# Patient Record
Sex: FEMALE | Race: BLACK OR AFRICAN AMERICAN | Employment: FULL TIME | URBAN - METROPOLITAN AREA
[De-identification: names, ages, dates, MRNs, and addresses within clinical notes are randomized per-mention and may not be internally consistent; named-entity substitution may affect disease eponyms.]

---

## 2023-09-04 ENCOUNTER — HOSPITAL ENCOUNTER (EMERGENCY)
Facility: HOSPITAL | Age: 23
Discharge: HOME | End: 2023-09-04
Attending: STUDENT IN AN ORGANIZED HEALTH CARE EDUCATION/TRAINING PROGRAM
Payer: COMMERCIAL

## 2023-09-04 ENCOUNTER — APPOINTMENT (OUTPATIENT)
Facility: HOSPITAL | Age: 23
End: 2023-09-04
Payer: COMMERCIAL

## 2023-09-04 VITALS
DIASTOLIC BLOOD PRESSURE: 90 MMHG | WEIGHT: 220 LBS | HEART RATE: 80 BPM | RESPIRATION RATE: 18 BRPM | BODY MASS INDEX: 37.56 KG/M2 | HEIGHT: 64 IN | OXYGEN SATURATION: 100 % | TEMPERATURE: 99.4 F | SYSTOLIC BLOOD PRESSURE: 155 MMHG

## 2023-09-04 DIAGNOSIS — O20.0 THREATENED MISCARRIAGE IN EARLY PREGNANCY: ICD-10-CM

## 2023-09-04 DIAGNOSIS — O20.9 VAGINAL BLEEDING BEFORE 22 WEEKS GESTATION: Primary | ICD-10-CM

## 2023-09-04 LAB — HCG UR QL: NEGATIVE

## 2023-09-04 PROCEDURE — 86901 BLOOD TYPING SEROLOGIC RH(D): CPT

## 2023-09-04 PROCEDURE — 81025 URINE PREGNANCY TEST: CPT

## 2023-09-04 PROCEDURE — 36415 COLL VENOUS BLD VENIPUNCTURE: CPT

## 2023-09-04 PROCEDURE — 86900 BLOOD TYPING SEROLOGIC ABO: CPT

## 2023-09-04 PROCEDURE — 76801 OB US < 14 WKS SINGLE FETUS: CPT

## 2023-09-04 PROCEDURE — 99284 EMERGENCY DEPT VISIT MOD MDM: CPT

## 2023-09-04 ASSESSMENT — PAIN DESCRIPTION - LOCATION: LOCATION: ABDOMEN

## 2023-09-04 ASSESSMENT — LIFESTYLE VARIABLES
HOW MANY STANDARD DRINKS CONTAINING ALCOHOL DO YOU HAVE ON A TYPICAL DAY: PATIENT DOES NOT DRINK
HOW OFTEN DO YOU HAVE A DRINK CONTAINING ALCOHOL: NEVER

## 2023-09-04 ASSESSMENT — PAIN - FUNCTIONAL ASSESSMENT
PAIN_FUNCTIONAL_ASSESSMENT: NONE - DENIES PAIN
PAIN_FUNCTIONAL_ASSESSMENT: 0-10

## 2023-09-04 ASSESSMENT — PAIN SCALES - GENERAL: PAINLEVEL_OUTOF10: 7

## 2023-09-04 NOTE — ED PROVIDER NOTES
IMPRESSION   No diagnosis found. DISPOSITION/PLAN   DISPOSITION      Discharge Note: The patient is stable for discharge home. The signs, symptoms, diagnosis, and discharge instructions have been discussed, understanding conveyed, and agreed upon. The patient is to follow up as recommended or return to ER should their symptoms worsen. PATIENT REFERRED TO:  No follow-up provider specified. DISCHARGE MEDICATIONS:     Medication List      You have not been prescribed any medications. DISCONTINUED MEDICATIONS:  There are no discharge medications for this patient. I am the Primary Clinician of Record. Cassandra Keene MD (electronically signed)    (Please note that parts of this dictation were completed with voice recognition software. Quite often unanticipated grammatical, syntax, homophones, and other interpretive errors are inadvertently transcribed by the computer software. Please disregards these errors.  Please excuse any errors that have escaped final proofreading.)     Cassandra Keene MD  09/05/23 [id number]

## 2023-09-04 NOTE — DISCHARGE INSTRUCTIONS
Your Rh level is pending and we strongly advise you to follow-up on the result and share this with your obstetrician. You can access your results at the following link online:    [email or web address]     We strongly advise you to contact your obstetrician tomorrow for reassessment. Please return to the emergency department if you have continuation of significant bleeding, abdominal or pelvic pain, fevers, or any other concerning symptoms. Thank you! Thank you for allowing me to care for you in the emergency department. It is my goal to provide you with excellent care. If you have not received excellent quality care, please ask to speak to the nurse manager. Please fill out the survey that will come to you by mail or email since we listen to your feedback! Below you will find a list of your tests from today's visit. Should you have any questions, please do not hesitate to call the emergency department. Labs  Recent Results (from the past 12 hour(s))   POC Pregnancy Urine Qual    Collection Time: 09/04/23  2:08 PM   Result Value Ref Range    Preg Test, Ur Negative Negative         Radiologic Studies  US TRANSABDOMINAL TRANSVAGINAL LESS THAN 14 WEEKS   Final Result   1. No intrauterine pregnancy. Open cervix. 2. Hepatic steatosis.        ------------------------------------------------------------------------------------------------------------  The exam and treatment you received in the Emergency Department were for an urgent problem and are not intended as complete care. It is important that you follow-up with a doctor, nurse practitioner, or physician assistant to:  (1) confirm your diagnosis,  (2) re-evaluation of changes in your illness and treatment, and  (3) for ongoing care. Please take your discharge instructions with you when you go to your follow-up appointment. If you have any problem arranging a follow-up appointment, contact the Emergency Department.

## 2023-09-04 NOTE — ED TRIAGE NOTES
Patient states she had a positive home pregnancy test. Complains of vaginal bleeding x 2 days.  First OB appt scheduled for 9/22

## 2023-09-05 LAB
ABO + RH BLD: NORMAL
BLOOD BANK CMNT PATIENT-IMP: NORMAL

## 2024-09-12 ENCOUNTER — APPOINTMENT (OUTPATIENT)
Facility: HOSPITAL | Age: 24
End: 2024-09-12

## 2024-09-12 ENCOUNTER — HOSPITAL ENCOUNTER (EMERGENCY)
Facility: HOSPITAL | Age: 24
Discharge: HOME | End: 2024-09-12
Attending: STUDENT IN AN ORGANIZED HEALTH CARE EDUCATION/TRAINING PROGRAM

## 2024-09-12 VITALS
RESPIRATION RATE: 18 BRPM | TEMPERATURE: 97.5 F | HEART RATE: 91 BPM | OXYGEN SATURATION: 100 % | DIASTOLIC BLOOD PRESSURE: 91 MMHG | BODY MASS INDEX: 37.61 KG/M2 | SYSTOLIC BLOOD PRESSURE: 168 MMHG | WEIGHT: 225.75 LBS | HEIGHT: 65 IN

## 2024-09-12 DIAGNOSIS — R10.9 ABDOMINAL PAIN, UNSPECIFIED ABDOMINAL LOCATION: Primary | ICD-10-CM

## 2024-09-12 LAB
ALBUMIN SERPL-MCNC: 4 G/DL (ref 3.5–5)
ALBUMIN/GLOB SERPL: 1 (ref 1.1–2.2)
ALP SERPL-CCNC: 72 U/L (ref 45–117)
ALT SERPL-CCNC: 41 U/L (ref 12–78)
ANION GAP SERPL CALC-SCNC: 6 MMOL/L (ref 2–12)
APPEARANCE UR: CLEAR
AST SERPL-CCNC: 24 U/L (ref 15–37)
BACTERIA URNS QL MICRO: NEGATIVE /HPF
BASOPHILS # BLD: 0.1 K/UL (ref 0–0.1)
BASOPHILS NFR BLD: 1 % (ref 0–1)
BILIRUB SERPL-MCNC: 0.6 MG/DL (ref 0.2–1)
BILIRUB UR QL: NEGATIVE
BUN SERPL-MCNC: 10 MG/DL (ref 6–20)
BUN/CREAT SERPL: 13 (ref 12–20)
CALCIUM SERPL-MCNC: 9.3 MG/DL (ref 8.5–10.1)
CHLORIDE SERPL-SCNC: 105 MMOL/L (ref 97–108)
CO2 SERPL-SCNC: 26 MMOL/L (ref 21–32)
COLOR UR: ABNORMAL
CREAT SERPL-MCNC: 0.76 MG/DL (ref 0.55–1.02)
DIFFERENTIAL METHOD BLD: NORMAL
EOSINOPHIL # BLD: 0.1 K/UL (ref 0–0.4)
EOSINOPHIL NFR BLD: 1 % (ref 0–7)
EPITH CASTS URNS QL MICRO: ABNORMAL /LPF
ERYTHROCYTE [DISTWIDTH] IN BLOOD BY AUTOMATED COUNT: 12.6 % (ref 11.5–14.5)
GLOBULIN SER CALC-MCNC: 4 G/DL (ref 2–4)
GLUCOSE SERPL-MCNC: 101 MG/DL (ref 65–100)
GLUCOSE UR STRIP.AUTO-MCNC: NEGATIVE MG/DL
HCG SERPL-ACNC: 96 MIU/ML (ref 0–6)
HCT VFR BLD AUTO: 40.3 % (ref 35–47)
HGB BLD-MCNC: 13.8 G/DL (ref 11.5–16)
HGB UR QL STRIP: NEGATIVE
HYALINE CASTS URNS QL MICRO: ABNORMAL /LPF (ref 0–2)
IMM GRANULOCYTES # BLD AUTO: 0 K/UL (ref 0–0.04)
IMM GRANULOCYTES NFR BLD AUTO: 0 % (ref 0–0.5)
KETONES UR QL STRIP.AUTO: NEGATIVE MG/DL
LEUKOCYTE ESTERASE UR QL STRIP.AUTO: NEGATIVE
LIPASE SERPL-CCNC: 26 U/L (ref 13–75)
LYMPHOCYTES # BLD: 2.7 K/UL (ref 0.8–3.5)
LYMPHOCYTES NFR BLD: 29 % (ref 12–49)
MAGNESIUM SERPL-MCNC: 2.3 MG/DL (ref 1.6–2.4)
MCH RBC QN AUTO: 29 PG (ref 26–34)
MCHC RBC AUTO-ENTMCNC: 34.2 G/DL (ref 30–36.5)
MCV RBC AUTO: 84.7 FL (ref 80–99)
MONOCYTES # BLD: 0.5 K/UL (ref 0–1)
MONOCYTES NFR BLD: 6 % (ref 5–13)
NEUTS SEG # BLD: 6 K/UL (ref 1.8–8)
NEUTS SEG NFR BLD: 63 % (ref 32–75)
NITRITE UR QL STRIP.AUTO: NEGATIVE
NRBC # BLD: 0 K/UL (ref 0–0.01)
NRBC BLD-RTO: 0 PER 100 WBC
PH UR STRIP: 6 (ref 5–8)
PLATELET # BLD AUTO: 345 K/UL (ref 150–400)
PMV BLD AUTO: 10.2 FL (ref 8.9–12.9)
POTASSIUM SERPL-SCNC: 3.9 MMOL/L (ref 3.5–5.1)
PROT SERPL-MCNC: 8 G/DL (ref 6.4–8.2)
PROT UR STRIP-MCNC: NEGATIVE MG/DL
RBC # BLD AUTO: 4.76 M/UL (ref 3.8–5.2)
RBC #/AREA URNS HPF: ABNORMAL /HPF (ref 0–5)
SODIUM SERPL-SCNC: 137 MMOL/L (ref 136–145)
SP GR UR REFRACTOMETRY: 1.01 (ref 1–1.03)
UROBILINOGEN UR QL STRIP.AUTO: 0.2 EU/DL (ref 0.2–1)
WBC # BLD AUTO: 9.3 K/UL (ref 3.6–11)
WBC URNS QL MICRO: ABNORMAL /HPF (ref 0–4)

## 2024-09-12 PROCEDURE — 76817 TRANSVAGINAL US OBSTETRIC: CPT

## 2024-09-12 PROCEDURE — 83690 ASSAY OF LIPASE: CPT

## 2024-09-12 PROCEDURE — 83735 ASSAY OF MAGNESIUM: CPT

## 2024-09-12 PROCEDURE — 80053 COMPREHEN METABOLIC PANEL: CPT

## 2024-09-12 PROCEDURE — 36415 COLL VENOUS BLD VENIPUNCTURE: CPT

## 2024-09-12 PROCEDURE — 81001 URINALYSIS AUTO W/SCOPE: CPT

## 2024-09-12 PROCEDURE — 76770 US EXAM ABDO BACK WALL COMP: CPT

## 2024-09-12 PROCEDURE — 6370000000 HC RX 637 (ALT 250 FOR IP): Performed by: STUDENT IN AN ORGANIZED HEALTH CARE EDUCATION/TRAINING PROGRAM

## 2024-09-12 PROCEDURE — 2580000003 HC RX 258: Performed by: STUDENT IN AN ORGANIZED HEALTH CARE EDUCATION/TRAINING PROGRAM

## 2024-09-12 PROCEDURE — 84702 CHORIONIC GONADOTROPIN TEST: CPT

## 2024-09-12 PROCEDURE — 99284 EMERGENCY DEPT VISIT MOD MDM: CPT

## 2024-09-12 PROCEDURE — 85025 COMPLETE CBC W/AUTO DIFF WBC: CPT

## 2024-09-12 PROCEDURE — 76705 ECHO EXAM OF ABDOMEN: CPT

## 2024-09-12 RX ORDER — ACETAMINOPHEN 500 MG
1000 TABLET ORAL ONCE
Status: COMPLETED | OUTPATIENT
Start: 2024-09-12 | End: 2024-09-12

## 2024-09-12 RX ORDER — SODIUM CHLORIDE, SODIUM LACTATE, POTASSIUM CHLORIDE, AND CALCIUM CHLORIDE .6; .31; .03; .02 G/100ML; G/100ML; G/100ML; G/100ML
1000 INJECTION, SOLUTION INTRAVENOUS
Status: COMPLETED | OUTPATIENT
Start: 2024-09-12 | End: 2024-09-12

## 2024-09-12 RX ADMIN — ACETAMINOPHEN 1000 MG: 500 TABLET ORAL at 09:58

## 2024-09-12 RX ADMIN — SODIUM CHLORIDE, POTASSIUM CHLORIDE, SODIUM LACTATE AND CALCIUM CHLORIDE 1000 ML: 600; 310; 30; 20 INJECTION, SOLUTION INTRAVENOUS at 09:58

## 2024-09-12 ASSESSMENT — LIFESTYLE VARIABLES
HOW OFTEN DO YOU HAVE A DRINK CONTAINING ALCOHOL: NEVER
HOW MANY STANDARD DRINKS CONTAINING ALCOHOL DO YOU HAVE ON A TYPICAL DAY: PATIENT DOES NOT DRINK

## 2024-09-12 ASSESSMENT — PAIN DESCRIPTION - DESCRIPTORS: DESCRIPTORS: ACHING

## 2024-09-12 ASSESSMENT — PAIN DESCRIPTION - ORIENTATION: ORIENTATION: MID;RIGHT

## 2024-09-12 ASSESSMENT — PAIN DESCRIPTION - LOCATION: LOCATION: ABDOMEN

## 2024-09-12 ASSESSMENT — PAIN SCALES - GENERAL: PAINLEVEL_OUTOF10: 7

## 2024-09-12 ASSESSMENT — PAIN - FUNCTIONAL ASSESSMENT: PAIN_FUNCTIONAL_ASSESSMENT: 0-10

## 2024-10-21 NOTE — PROGRESS NOTES
Loni Jain is a 24 y.o. female presents for a new pregnancy visit.    Chief Complaint   Patient presents with    Initial Prenatal Visit     Problems: Amenorrhea     Patient's last menstrual period was 2024 (exact date).    LMP history:  The date of her LMP is  certain.  Her last menstrual period was normal and lasted for 4 to 5 days.  A urine pregnancy test was positive 6 weeks ago. She was not on the pill at conception.     Based on her LMP, her EDC is 25 and her EGA is 13 weeks,2 days. Her menstrual cycles are regular and occur approximately every 28 days and range from 3 to 5 days. The last menses lasted  the usual number of days.      Pregnancy symptoms:    Since her LMP she has experienced urinary frequency, breast tenderness, and nausea.   She has not been vomiting over the last few weeks.  Associated signs and symptoms which she denies: dysuria, discharge, vaginal bleeding.    She states she has gained weight:  Approximately 5 pounds over the last few weeks.    Relevant past pregnancy history:   She has the following pregnancy history:     She has no history of  delivery.    Relevant past medical history:(relevant to this pregnancy): noncontributory.      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     1. Have you been to the ER, urgent care clinic, or hospitalized since your last visit? , same day took pregnancy test, had lower r sided pain    2. Have you seen or consulted any other health care providers outside of the [facility] since your last visit? No    Examination chaperoned by Dayana Krause LPN.

## 2024-10-29 ENCOUNTER — INITIAL PRENATAL (OUTPATIENT)
Age: 24
End: 2024-10-29

## 2024-10-29 VITALS
HEART RATE: 96 BPM | BODY MASS INDEX: 36.94 KG/M2 | WEIGHT: 222 LBS | DIASTOLIC BLOOD PRESSURE: 85 MMHG | SYSTOLIC BLOOD PRESSURE: 140 MMHG

## 2024-10-29 DIAGNOSIS — Z3A.13 13 WEEKS GESTATION OF PREGNANCY: ICD-10-CM

## 2024-10-29 DIAGNOSIS — O16.9 HYPERTENSION AFFECTING PREGNANCY, ANTEPARTUM: ICD-10-CM

## 2024-10-29 DIAGNOSIS — O09.90 SUPERVISION OF HIGH RISK PREGNANCY, ANTEPARTUM: Primary | ICD-10-CM

## 2024-10-29 DIAGNOSIS — E66.01 SEVERE OBESITY DUE TO EXCESS CALORIES AFFECTING PREGNANCY, ANTEPARTUM: ICD-10-CM

## 2024-10-29 DIAGNOSIS — O99.210 SEVERE OBESITY DUE TO EXCESS CALORIES AFFECTING PREGNANCY, ANTEPARTUM: ICD-10-CM

## 2024-10-29 PROBLEM — Z34.80 SUPERVISION OF OTHER NORMAL PREGNANCY, ANTEPARTUM: Status: ACTIVE | Noted: 2024-10-29

## 2024-10-29 PROCEDURE — 0501F PRENATAL FLOW SHEET: CPT | Performed by: OBSTETRICS & GYNECOLOGY

## 2024-10-29 RX ORDER — NIFEDIPINE 30 MG/1
30 TABLET, EXTENDED RELEASE ORAL DAILY
Qty: 90 TABLET | Refills: 4 | Status: SHIPPED | OUTPATIENT
Start: 2024-10-29

## 2024-10-29 RX ORDER — PNV NO.95/FERROUS FUM/FOLIC AC 28MG-0.8MG
1 TABLET ORAL DAILY
Qty: 90 TABLET | Refills: 5 | Status: SHIPPED | OUTPATIENT
Start: 2024-10-29

## 2024-10-29 NOTE — PROGRESS NOTES
Current pregnancy history:    Loni Jain is a ,  24 y.o. female Black /  Patient's last menstrual period was 2024 (exact date)..  She presents for the evaluation of amenorrhea and a positive pregnancy test. One prenatal visit at [facility] in [place]. No records. Has US pics to establish dating.     Per nursing Note:  LMP history:  The date of her LMP is  certain.  Her last menstrual period was normal and lasted for 4 to 5 days.  A urine pregnancy test was positive 6 weeks ago. She was not on the pill at conception.      Based on her LMP, her EDC is 25 and her EGA is 13 weeks,2 days. Her menstrual cycles are regular and occur approximately every 28 days and range from 3 to 5 days. The last menses lasted  the usual number of days.      Pregnancy symptoms:     Since her LMP she has experienced urinary frequency, breast tenderness, and nausea.   She has not been vomiting over the last few weeks.  Associated signs and symptoms which she denies: dysuria, discharge, vaginal bleeding.     She states she has gained weight:  Approximately 5 pounds over the last few weeks.     Relevant past pregnancy history:              She has the following pregnancy history:                She has no history of  delivery.     Relevant past medical history:(relevant to this pregnancy): noncontributory.       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     Substance history: negative for alcohol, tobacco and street drugs.           Positive for nothing.  Exposure history: There is/are no indoor cat/s in the home.  She admits close contact with children on a regular basis.   She has had chicken pox or the vaccine in the past.   Patient denies issues with domestic violence.     Genetic Screening/Teratology Counseling: (Includes patient, baby's father, or anyone in either family with:)  1.  Patient's age >/= 35 at EDC?-- no  .   2.  Thalassemia (Kiswahili, Greek, Mediterranean, or

## 2024-10-30 LAB
ALBUMIN SERPL-MCNC: 4.1 G/DL (ref 3.5–5)
ALBUMIN/GLOB SERPL: 1.2 (ref 1.1–2.2)
ALP SERPL-CCNC: 62 U/L (ref 45–117)
ALT SERPL-CCNC: 34 U/L (ref 12–78)
ANION GAP SERPL CALC-SCNC: 6 MMOL/L (ref 2–12)
AST SERPL-CCNC: 16 U/L (ref 15–37)
BILIRUB SERPL-MCNC: 0.5 MG/DL (ref 0.2–1)
BUN SERPL-MCNC: 9 MG/DL (ref 6–20)
BUN/CREAT SERPL: 12 (ref 12–20)
CALCIUM SERPL-MCNC: 10.1 MG/DL (ref 8.5–10.1)
CHLORIDE SERPL-SCNC: 105 MMOL/L (ref 97–108)
CO2 SERPL-SCNC: 26 MMOL/L (ref 21–32)
CREAT SERPL-MCNC: 0.74 MG/DL (ref 0.55–1.02)
CREAT UR-MCNC: 241 MG/DL
GLOBULIN SER CALC-MCNC: 3.4 G/DL (ref 2–4)
GLUCOSE SERPL-MCNC: 102 MG/DL (ref 65–100)
POTASSIUM SERPL-SCNC: 4 MMOL/L (ref 3.5–5.1)
PROT SERPL-MCNC: 7.5 G/DL (ref 6.4–8.2)
PROT UR-MCNC: 14 MG/DL (ref 0–11.9)
PROT/CREAT UR-RTO: 0.1
SODIUM SERPL-SCNC: 137 MMOL/L (ref 136–145)

## 2024-10-30 NOTE — PROGRESS NOTES
EDC 2025 by [facility]  CHTN - procardia xl 30mg  PCR  Baby ASA  Needs varicella titer  Flu vaccine at work  Sister 32 weeks baby  anomalies  Obesity  MFM

## 2024-11-02 LAB
., LABCORP: NORMAL
C TRACH RRNA CVX QL NAA+PROBE: NEGATIVE
CYTOLOGIST CVX/VAG CYTO: NORMAL
CYTOLOGY CVX/VAG DOC CYTO: NORMAL
CYTOLOGY CVX/VAG DOC THIN PREP: NORMAL
DX ICD CODE: NORMAL
Lab: NORMAL
N GONORRHOEA RRNA CVX QL NAA+PROBE: NEGATIVE
OTHER STN SPEC: NORMAL
STAT OF ADQ CVX/VAG CYTO-IMP: NORMAL
T VAGINALIS RRNA SPEC QL NAA+PROBE: NEGATIVE

## 2024-11-05 ENCOUNTER — ROUTINE PRENATAL (OUTPATIENT)
Age: 24
End: 2024-11-05

## 2024-11-05 VITALS
DIASTOLIC BLOOD PRESSURE: 84 MMHG | HEART RATE: 76 BPM | WEIGHT: 222 LBS | BODY MASS INDEX: 36.94 KG/M2 | SYSTOLIC BLOOD PRESSURE: 143 MMHG

## 2024-11-05 DIAGNOSIS — Z3A.11 11 WEEKS GESTATION OF PREGNANCY: ICD-10-CM

## 2024-11-05 DIAGNOSIS — O16.9 HYPERTENSION AFFECTING PREGNANCY, ANTEPARTUM: ICD-10-CM

## 2024-11-05 DIAGNOSIS — E66.01 SEVERE OBESITY DUE TO EXCESS CALORIES AFFECTING PREGNANCY, ANTEPARTUM: ICD-10-CM

## 2024-11-05 DIAGNOSIS — O99.210 SEVERE OBESITY DUE TO EXCESS CALORIES AFFECTING PREGNANCY, ANTEPARTUM: ICD-10-CM

## 2024-11-05 DIAGNOSIS — O09.90 SUPERVISION OF HIGH RISK PREGNANCY, ANTEPARTUM: Primary | ICD-10-CM

## 2024-11-05 PROCEDURE — 0502F SUBSEQUENT PRENATAL CARE: CPT | Performed by: OBSTETRICS & GYNECOLOGY

## 2024-11-05 RX ORDER — LABETALOL 100 MG/1
100 TABLET, FILM COATED ORAL 2 TIMES DAILY
Qty: 180 TABLET | Refills: 5 | Status: SHIPPED | OUTPATIENT
Start: 2024-11-05

## 2024-11-05 RX ORDER — NIFEDIPINE 30 MG/1
30 TABLET, EXTENDED RELEASE ORAL 2 TIMES DAILY
Qty: 180 TABLET | Refills: 3 | Status: SHIPPED | OUTPATIENT
Start: 2024-11-05 | End: 2024-11-05

## 2024-11-05 SDOH — ECONOMIC STABILITY: FOOD INSECURITY: WITHIN THE PAST 12 MONTHS, YOU WORRIED THAT YOUR FOOD WOULD RUN OUT BEFORE YOU GOT MONEY TO BUY MORE.: NEVER TRUE

## 2024-11-05 SDOH — ECONOMIC STABILITY: FOOD INSECURITY: WITHIN THE PAST 12 MONTHS, THE FOOD YOU BOUGHT JUST DIDN'T LAST AND YOU DIDN'T HAVE MONEY TO GET MORE.: NEVER TRUE

## 2024-11-05 SDOH — ECONOMIC STABILITY: INCOME INSECURITY: HOW HARD IS IT FOR YOU TO PAY FOR THE VERY BASICS LIKE FOOD, HOUSING, MEDICAL CARE, AND HEATING?: NOT VERY HARD

## 2024-11-05 SDOH — ECONOMIC STABILITY: TRANSPORTATION INSECURITY
IN THE PAST 12 MONTHS, HAS LACK OF TRANSPORTATION KEPT YOU FROM MEETINGS, WORK, OR FROM GETTING THINGS NEEDED FOR DAILY LIVING?: NO

## 2024-11-05 NOTE — PROGRESS NOTES
EDC 2025 by [facility]  CHTN - labetalol 100mg bid  PCR 0.1  Baby ASA  Needs varicella titer  Flu vaccine at work  Sister 32 weeks baby  anomalies  Obesity  M

## 2024-11-05 NOTE — PROGRESS NOTES
Stopped procardia and has not taken for 5 days because made her feel shaky  Will start labetalol 100mg bid    Brings copy of US from clinic done 10/15 - measured 8w5d  Other US done at [facility] apparently was only 6 weeks on US - the measurements on the pic are incorrect    New EDC is 5/22/25  RTO 1 week with US to confirm

## 2024-11-06 LAB
Lab: NORMAL
NTRA FETAL FRACTION: NORMAL
NTRA GENDER OF FETUS: NORMAL
NTRA MONOSOMY X AGE-BASED RISK TEXT: NORMAL
NTRA MONOSOMY X RESULT TEXT: NORMAL
NTRA MONOSOMY X RISK SCORE TEXT: NORMAL
NTRA TRIPLOIDY RESULT TEXT: NORMAL
NTRA TRISOMY 13 AGE-BASED RISK TEXT: NORMAL
NTRA TRISOMY 13 RESULT TEXT: NORMAL
NTRA TRISOMY 13 RISK SCORE TEXT: NORMAL
NTRA TRISOMY 18 AGE-BASED RISK TEXT: NORMAL
NTRA TRISOMY 18 RESULT TEXT: NORMAL
NTRA TRISOMY 18 RISK SCORE TEXT: NORMAL
NTRA TRISOMY 21 AGE-BASED RISK TEXT: NORMAL
NTRA TRISOMY 21 RESULT TEXT: NORMAL
NTRA TRISOMY 21 RISK SCORE TEXT: NORMAL

## 2024-11-12 DIAGNOSIS — O09.90 SUPERVISION OF HIGH RISK PREGNANCY, ANTEPARTUM: Primary | ICD-10-CM

## 2024-11-14 ENCOUNTER — ROUTINE PRENATAL (OUTPATIENT)
Age: 24
End: 2024-11-14

## 2024-11-14 VITALS
HEART RATE: 80 BPM | WEIGHT: 221.2 LBS | SYSTOLIC BLOOD PRESSURE: 116 MMHG | DIASTOLIC BLOOD PRESSURE: 75 MMHG | BODY MASS INDEX: 36.81 KG/M2

## 2024-11-14 DIAGNOSIS — O99.210 SEVERE OBESITY DUE TO EXCESS CALORIES AFFECTING PREGNANCY, ANTEPARTUM: ICD-10-CM

## 2024-11-14 DIAGNOSIS — Z3A.13 13 WEEKS GESTATION OF PREGNANCY: ICD-10-CM

## 2024-11-14 DIAGNOSIS — O09.90 SUPERVISION OF HIGH RISK PREGNANCY, ANTEPARTUM: Primary | ICD-10-CM

## 2024-11-14 DIAGNOSIS — O16.9 HYPERTENSION AFFECTING PREGNANCY, ANTEPARTUM: ICD-10-CM

## 2024-11-14 DIAGNOSIS — E66.01 SEVERE OBESITY DUE TO EXCESS CALORIES AFFECTING PREGNANCY, ANTEPARTUM: ICD-10-CM

## 2024-11-14 DIAGNOSIS — Z36.9 ENCOUNTER FOR ANTENATAL SCREENING OF MOTHER: ICD-10-CM

## 2024-11-14 NOTE — PROGRESS NOTES
BP much better on labetalol 100mg bid  US today c/w dating at pregnancy center she showed me last week  Needs to repeat NIPTS due to new dating and insufficient DNA  Needs prenatal labs today    Ultrasound Result (most recent):  US OB LESS THEN 14 WKS SINGLE OR FIRST GESTATION 11/14/2024    Narrative  CRL (Hadlock) 6.84 cm 6.84 avg. 50.4% 13w1d    Left Ovary  Length 2.81 cm 2.81 avg.  Width 1.56 cm 1.56 avg.  Height 1.44 cm 1.44 avg.  Volume 3.305 cm³ 3.305    Right Ovary  Length 3.69 cm 3.69 avg.  Width 2.09 cm 2.09 avg.  Height 2.16 cm 2.16 avg.  Volume 8.722 cm³ 8.722    Fetal Heart Rate  Ventricular  bpm 159 avg.  Cardiac rhythm regular (normal)    Placenta Location anterior    Transabdominal ultrasound was performed.  Single viable 13-week 1 day IUP seen with normal cardiac rhythm 159BPM.  Placenta is anterior.  Right and left ovary appeared normal.  There is no free fluid in the cul-de-sac.

## 2024-11-15 ENCOUNTER — TELEPHONE (OUTPATIENT)
Age: 24
End: 2024-11-15

## 2024-11-15 LAB
ABO + RH BLD: NORMAL
BLOOD BANK CMNT PATIENT-IMP: NORMAL
BLOOD GROUP ANTIBODIES SERPL: NORMAL
ERYTHROCYTE [DISTWIDTH] IN BLOOD BY AUTOMATED COUNT: 12.7 % (ref 11.5–14.5)
HBV SURFACE AG SER QL: 0.71 INDEX
HBV SURFACE AG SER QL: NEGATIVE
HCT VFR BLD AUTO: 37.9 % (ref 35–47)
HCV AB SER IA-ACNC: 0.16 INDEX
HCV AB SERPL QL IA: NONREACTIVE
HEP B, EXTERNAL RESULT: NEGATIVE
HEPATITIS C ANTIBODY, EXTERNAL RESULT: NON REACTIVE
HGB BLD-MCNC: 12.6 G/DL (ref 11.5–16)
HIV 1+2 AB+HIV1 P24 AG SERPL QL IA: NONREACTIVE
HIV 1/2 RESULT COMMENT: NORMAL
HIV, EXTERNAL RESULT: NON REACTIVE
MCH RBC QN AUTO: 29 PG (ref 26–34)
MCHC RBC AUTO-ENTMCNC: 33.2 G/DL (ref 30–36.5)
MCV RBC AUTO: 87.1 FL (ref 80–99)
NRBC # BLD: 0 K/UL (ref 0–0.01)
NRBC BLD-RTO: 0 PER 100 WBC
PLATELET # BLD AUTO: 296 K/UL (ref 150–400)
PMV BLD AUTO: 10.6 FL (ref 8.9–12.9)
RBC # BLD AUTO: 4.35 M/UL (ref 3.8–5.2)
RUBELLA TITER, EXTERNAL RESULT: NORMAL
RUBV IGG SERPL IA-ACNC: NORMAL IU/ML
SPECIMEN EXP DATE BLD: NORMAL
T. PALLIDUM (SYPHILIS) ANTIBODY, EXTERNAL RESULT: NON REACTIVE
WBC # BLD AUTO: 10.1 K/UL (ref 3.6–11)

## 2024-11-15 NOTE — TELEPHONE ENCOUNTER
Spoke with  Center to advise that referral previously placed had incorrect gestational age and fetal anatomy scan appointment will need to be rescheduled.  center to reach out to patient for rescheduling.

## 2024-11-17 LAB — VZV IGG SER IA-ACNC: REACTIVE

## 2024-11-18 LAB — T PALLIDUM AB SER QL IA: NON REACTIVE

## 2024-12-03 ENCOUNTER — APPOINTMENT (OUTPATIENT)
Facility: HOSPITAL | Age: 24
End: 2024-12-03

## 2024-12-03 ENCOUNTER — HOSPITAL ENCOUNTER (EMERGENCY)
Facility: HOSPITAL | Age: 24
Discharge: HOME | End: 2024-12-03
Attending: STUDENT IN AN ORGANIZED HEALTH CARE EDUCATION/TRAINING PROGRAM

## 2024-12-03 VITALS
WEIGHT: 220.46 LBS | HEART RATE: 89 BPM | DIASTOLIC BLOOD PRESSURE: 86 MMHG | RESPIRATION RATE: 16 BRPM | SYSTOLIC BLOOD PRESSURE: 138 MMHG | BODY MASS INDEX: 36.73 KG/M2 | OXYGEN SATURATION: 100 % | TEMPERATURE: 98.3 F | HEIGHT: 65 IN

## 2024-12-03 DIAGNOSIS — O44.02 PLACENTA PREVIA IN SECOND TRIMESTER: ICD-10-CM

## 2024-12-03 DIAGNOSIS — O46.90 VAGINAL BLEEDING IN PREGNANCY: Primary | ICD-10-CM

## 2024-12-03 LAB
ALBUMIN SERPL-MCNC: 3.4 G/DL (ref 3.5–5)
ALBUMIN/GLOB SERPL: 0.8 (ref 1.1–2.2)
ALP SERPL-CCNC: 55 U/L (ref 45–117)
ALT SERPL-CCNC: 33 U/L (ref 12–78)
ANION GAP SERPL CALC-SCNC: 9 MMOL/L (ref 2–12)
APPEARANCE UR: CLEAR
AST SERPL-CCNC: 16 U/L (ref 15–37)
BACTERIA URNS QL MICRO: ABNORMAL /HPF
BASOPHILS # BLD: 0 K/UL (ref 0–0.1)
BASOPHILS NFR BLD: 0 % (ref 0–1)
BILIRUB SERPL-MCNC: 0.4 MG/DL (ref 0.2–1)
BILIRUB UR QL: NEGATIVE
BUN SERPL-MCNC: 9 MG/DL (ref 6–20)
BUN/CREAT SERPL: 13 (ref 12–20)
CALCIUM SERPL-MCNC: 9.7 MG/DL (ref 8.5–10.1)
CHLORIDE SERPL-SCNC: 103 MMOL/L (ref 97–108)
CO2 SERPL-SCNC: 24 MMOL/L (ref 21–32)
COLOR UR: ABNORMAL
CREAT SERPL-MCNC: 0.71 MG/DL (ref 0.55–1.02)
DIFFERENTIAL METHOD BLD: ABNORMAL
EOSINOPHIL # BLD: 0.2 K/UL (ref 0–0.4)
EOSINOPHIL NFR BLD: 1 % (ref 0–7)
EPITH CASTS URNS QL MICRO: ABNORMAL /LPF
ERYTHROCYTE [DISTWIDTH] IN BLOOD BY AUTOMATED COUNT: 12.9 % (ref 11.5–14.5)
GLOBULIN SER CALC-MCNC: 4.1 G/DL (ref 2–4)
GLUCOSE SERPL-MCNC: 117 MG/DL (ref 65–100)
GLUCOSE UR STRIP.AUTO-MCNC: NEGATIVE MG/DL
HCT VFR BLD AUTO: 35.4 % (ref 35–47)
HGB BLD-MCNC: 12.1 G/DL (ref 11.5–16)
HGB UR QL STRIP: ABNORMAL
IMM GRANULOCYTES # BLD AUTO: 0 K/UL (ref 0–0.04)
IMM GRANULOCYTES NFR BLD AUTO: 0 % (ref 0–0.5)
KETONES UR QL STRIP.AUTO: NEGATIVE MG/DL
LEUKOCYTE ESTERASE UR QL STRIP.AUTO: NEGATIVE
LYMPHOCYTES # BLD: 2.7 K/UL (ref 0.8–3.5)
LYMPHOCYTES NFR BLD: 23 % (ref 12–49)
MCH RBC QN AUTO: 29.2 PG (ref 26–34)
MCHC RBC AUTO-ENTMCNC: 34.2 G/DL (ref 30–36.5)
MCV RBC AUTO: 85.3 FL (ref 80–99)
MONOCYTES # BLD: 0.6 K/UL (ref 0–1)
MONOCYTES NFR BLD: 5 % (ref 5–13)
NEUTS SEG # BLD: 7.9 K/UL (ref 1.8–8)
NEUTS SEG NFR BLD: 71 % (ref 32–75)
NITRITE UR QL STRIP.AUTO: NEGATIVE
NRBC # BLD: 0 K/UL (ref 0–0.01)
NRBC BLD-RTO: 0 PER 100 WBC
PH UR STRIP: 6.5 (ref 5–8)
PLATELET # BLD AUTO: 310 K/UL (ref 150–400)
PMV BLD AUTO: 10.5 FL (ref 8.9–12.9)
POTASSIUM SERPL-SCNC: 3.4 MMOL/L (ref 3.5–5.1)
PROT SERPL-MCNC: 7.5 G/DL (ref 6.4–8.2)
PROT UR STRIP-MCNC: NEGATIVE MG/DL
RBC # BLD AUTO: 4.15 M/UL (ref 3.8–5.2)
RBC #/AREA URNS HPF: ABNORMAL /HPF (ref 0–5)
SODIUM SERPL-SCNC: 136 MMOL/L (ref 136–145)
SP GR UR REFRACTOMETRY: 1.01 (ref 1–1.03)
SPECIMEN HOLD: NORMAL
UROBILINOGEN UR QL STRIP.AUTO: 0.2 EU/DL (ref 0.2–1)
WBC # BLD AUTO: 11.4 K/UL (ref 3.6–11)
WBC URNS QL MICRO: ABNORMAL /HPF (ref 0–4)

## 2024-12-03 PROCEDURE — 85025 COMPLETE CBC W/AUTO DIFF WBC: CPT

## 2024-12-03 PROCEDURE — 99284 EMERGENCY DEPT VISIT MOD MDM: CPT

## 2024-12-03 PROCEDURE — 86901 BLOOD TYPING SEROLOGIC RH(D): CPT

## 2024-12-03 PROCEDURE — 86900 BLOOD TYPING SEROLOGIC ABO: CPT

## 2024-12-03 PROCEDURE — 80053 COMPREHEN METABOLIC PANEL: CPT

## 2024-12-03 PROCEDURE — 81001 URINALYSIS AUTO W/SCOPE: CPT

## 2024-12-03 PROCEDURE — 76815 OB US LIMITED FETUS(S): CPT

## 2024-12-03 PROCEDURE — 36415 COLL VENOUS BLD VENIPUNCTURE: CPT

## 2024-12-03 ASSESSMENT — PAIN SCALES - GENERAL: PAINLEVEL_OUTOF10: 0

## 2024-12-03 ASSESSMENT — PAIN - FUNCTIONAL ASSESSMENT: PAIN_FUNCTIONAL_ASSESSMENT: 0-10

## 2024-12-04 ENCOUNTER — ROUTINE PRENATAL (OUTPATIENT)
Age: 24
End: 2024-12-04

## 2024-12-04 ENCOUNTER — TELEPHONE (OUTPATIENT)
Age: 24
End: 2024-12-04

## 2024-12-04 VITALS
SYSTOLIC BLOOD PRESSURE: 135 MMHG | DIASTOLIC BLOOD PRESSURE: 86 MMHG | HEART RATE: 78 BPM | BODY MASS INDEX: 36.44 KG/M2 | WEIGHT: 219 LBS

## 2024-12-04 DIAGNOSIS — O09.90 SUPERVISION OF HIGH RISK PREGNANCY, ANTEPARTUM: Primary | ICD-10-CM

## 2024-12-04 DIAGNOSIS — E66.01 SEVERE OBESITY DUE TO EXCESS CALORIES AFFECTING PREGNANCY, ANTEPARTUM: ICD-10-CM

## 2024-12-04 DIAGNOSIS — O16.9 HYPERTENSION AFFECTING PREGNANCY, ANTEPARTUM: ICD-10-CM

## 2024-12-04 DIAGNOSIS — Z3A.15 15 WEEKS GESTATION OF PREGNANCY: ICD-10-CM

## 2024-12-04 DIAGNOSIS — O99.210 SEVERE OBESITY DUE TO EXCESS CALORIES AFFECTING PREGNANCY, ANTEPARTUM: ICD-10-CM

## 2024-12-04 DIAGNOSIS — O46.92 SECOND TRIMESTER BLEEDING: Primary | ICD-10-CM

## 2024-12-04 DIAGNOSIS — O46.92 SECOND TRIMESTER BLEEDING: ICD-10-CM

## 2024-12-04 LAB
ABO + RH BLD: NORMAL
BLOOD BANK CMNT PATIENT-IMP: NORMAL

## 2024-12-04 PROCEDURE — 0502F SUBSEQUENT PRENATAL CARE: CPT | Performed by: OBSTETRICS & GYNECOLOGY

## 2024-12-04 RX ORDER — ASPIRIN 81 MG/1
81 TABLET, CHEWABLE ORAL DAILY
COMMUNITY

## 2024-12-04 NOTE — ED PROVIDER NOTES
[facility] EMERGENCY DEPT  EMERGENCY DEPARTMENT ENCOUNTER      Pt Name: Loni Jain  MRN: [id number]  Birthdate 2000  Date of evaluation: 12/3/2024  Provider: [facility]    CHIEF COMPLAINT       Chief Complaint   Patient presents with    Vaginal Bleeding         HISTORY OF PRESENT ILLNESS    Patient is a 23 yo female who is currently 15w5d gestation presents to ED due to vaginal bleeding that started about 1 hour ago. Reports she had to urinate and when she went to the bathroom noticed bright red blood in her underwear and blood when she urinated. Reports since then she has not had any vaginal bleeding. Denies similar sx previously. Denies any abdominal pain or cramping, nausea, vomiting, flank pain, dysuria, urinary urgency. No meds pta. OBGYN Dr. Wilkerson.             Review of External Medical Records:     Nursing Notes were reviewed.    REVIEW OF SYSTEMS       Review of Systems   All other systems reviewed and are negative.      Except as noted above the remainder of the review of systems was reviewed and negative.       PAST MEDICAL HISTORY     Past Medical History:   Diagnosis Date    Papanicolaou smear for cervical cancer screening 10/29/2024    Normal         SURGICAL HISTORY       Past Surgical History:   Procedure Laterality Date    WISDOM TOOTH EXTRACTION  2021         CURRENT MEDICATIONS       Discharge Medication List as of 12/3/2024 10:16 PM        CONTINUE these medications which have NOT CHANGED    Details   labetalol (NORMODYNE) 100 MG tablet Take 1 tablet by mouth 2 times daily, Disp-180 tablet, R-5Normal      Prenatal MV & Min w/FA-DHA (PRENATAL ADULT GUMMY/DHA/FA PO) Take by mouthHistorical Med      NIFEdipine (PROCARDIA XL) 30 MG extended release tablet Take 1 tablet by mouth daily, Disp-90 tablet, R-4Normal      Prenatal Vit-Fe Fumarate-FA (PRENATAL VITAMIN) 27-0.8 MG TABS Take 1 tablet by mouth daily, Disp-90 tablet, R-5May substitute any prenatal vitamin with iron covered by

## 2024-12-04 NOTE — ED TRIAGE NOTES
Pt is 16 weeks pregnant, reports vaginal bleeding that started about an hour ago. States bleeding seems to have stopped now   Endorses lower abd pain when she coughs  Denies burning with urination     This is 2nd pregnancy, had a miscarriage last year at 6 weeks     Pt sees Eveline GELLER

## 2024-12-04 NOTE — DISCHARGE INSTRUCTIONS
Your ultrasound is concerning for placenta previa. This can change throughout pregnancy. Recommend close follow-up with your OBGYN. If you develop new or worsening symptoms return to ER.

## 2024-12-04 NOTE — TELEPHONE ENCOUNTER
Two patient identifies used    24 year old  15w6d pregnant.    Patient calling to say that she went to the er yesterday due to about hour of heavy vaginal bleeding and clots and then when she got to er just spotting.    Patient calling to day to get appointment for follow up.    Patient reports just spotting now and random cramping lower right pelvis.    Patient was placed on the schedule to be seen today yasmany 10:00 am with ultrasound and then to see Dr. Wilkerson after ultrasound ( ok per Dr. Wilkerson)    Order for ultrasound placed as per MD verbal order and pended for signature.    Patient advised nothing in the vagina.    Patient verbalized understanding.

## 2024-12-04 NOTE — PROGRESS NOTES
Seen in ER yesterday with painless bleeding - resolved quickly, BR, no IC  Now just old spotting  Ultrasound Result (most recent):  US OB FOLLOW UP TRANSABDOMINAL APPROACH 12/04/2024    Narrative  AC/BPD/FL/HC 135g (5oz) ± 20g 35.9%    BPD (Hadlock) 3.08 cm 3.08 avg. 37.2% 15w5d  OFD (HC) 4.34 cm 4.34 avg.  HC (Hadlock) 12.04 cm 12.04 avg. 42.0% 16w0d  AC (Hadlock) 9.43 cm 9.43 avg. 41.4% 15w4d  FL (Hadlock) 2.00 cm 2.00 avg. 49.2% 16w0d    MVP  MVP 38.82 mm 38.82 avg.  Fetal Heart Rate  Ventricular  bpm 152 avg.    Limited OB scan was performed.  A single 15-week 6-day intrauterine pregnancy is seen with normal cardiac motion.  Appropriate fetal growth is seen.  TERESA and cervix appear normal.  The placenta appears to completely cover the cervical os.    Placenta covering os - pelvic rest, bleeding precautions  Fu 1 weeks with AFP  Has MFM scheduled

## 2024-12-12 ENCOUNTER — ROUTINE PRENATAL (OUTPATIENT)
Age: 24
End: 2024-12-12

## 2024-12-12 VITALS
HEART RATE: 76 BPM | SYSTOLIC BLOOD PRESSURE: 135 MMHG | WEIGHT: 218 LBS | DIASTOLIC BLOOD PRESSURE: 82 MMHG | BODY MASS INDEX: 36.28 KG/M2

## 2024-12-12 DIAGNOSIS — Z36.9 ENCOUNTER FOR ANTENATAL SCREENING OF MOTHER: ICD-10-CM

## 2024-12-12 DIAGNOSIS — O99.210 SEVERE OBESITY DUE TO EXCESS CALORIES AFFECTING PREGNANCY, ANTEPARTUM: ICD-10-CM

## 2024-12-12 DIAGNOSIS — E66.01 SEVERE OBESITY DUE TO EXCESS CALORIES AFFECTING PREGNANCY, ANTEPARTUM: ICD-10-CM

## 2024-12-12 DIAGNOSIS — O16.9 HYPERTENSION AFFECTING PREGNANCY, ANTEPARTUM: ICD-10-CM

## 2024-12-12 DIAGNOSIS — O09.90 SUPERVISION OF HIGH RISK PREGNANCY, ANTEPARTUM: Primary | ICD-10-CM

## 2024-12-12 DIAGNOSIS — Z3A.17 17 WEEKS GESTATION OF PREGNANCY: ICD-10-CM

## 2024-12-12 PROCEDURE — 0502F SUBSEQUENT PRENATAL CARE: CPT | Performed by: OBSTETRICS & GYNECOLOGY

## 2024-12-12 NOTE — PATIENT INSTRUCTIONS
Patient Education        Weeks 14 to 18 of Your Pregnancy: Care Instructions  Around this time, you may start to look pregnant. Your baby is now able to pass urine. And the first stool (meconium) is starting to collect in your baby's intestines. Hair is starting to grow on your baby's head.    You may notice some skin changes, such as itchy spots on your palms or acne on your face.   At your next doctor visit, you may have an ultrasound. So you might think about whether you want to know the sex of your baby. Also ask your doctor about flu and COVID-19 shots.      How to reduce stress   Ask for help when you need it.  Try to avoid things that cause you stress.  Seek out things that relieve stress, such as breathing exercises or yoga.     How to get exercise   If you don't usually exercise, start slowly. Short walks may be a good choice.  Try to be active 30 minutes a day, at least 5 days a week.  Avoid activities where you're more likely to fall.  Use light weights to reduce stress on your joints.     How to stay at a healthy weight for you   Talk to your doctor or midwife about how much weight you should gain.  It's generally best to gain:  About 28 to 40 pounds if you're underweight.  About 25 to 35 pounds if you're at a healthy weight.  About 15 to 25 pounds if you're overweight.  About 11 to 20 pounds if you're very overweight (obese).  Follow-up care is a key part of your treatment and safety. Be sure to make and go to all appointments, and call your doctor if you are having problems. It's also a good idea to know your test results and keep a list of the medicines you take.  Where can you learn more?  Go to https://[email or web address].net/patientEd and enter I453 to learn more about \"Weeks 14 to 18 of Your Pregnancy: Care Instructions.\"  Current as of: July 10, 2023  Content Version: 14.2  © 2024 [employer].   Care instructions adapted under license by [employer]. If you have questions about a medical

## 2024-12-12 NOTE — PROGRESS NOTES
EDC 2025 by US  NIPTS normal female  [facility]  CHTN - labetalol 100mg bid  PCR 0.1  Baby ASA  Needs varicella titer  Flu vaccine at work  Sister 32 weeks baby  anomalies  Obesity  MFM  Varicella immune

## 2024-12-12 NOTE — PROGRESS NOTES
Had episode of bleeding 3 days ago - gone by the morning  Some roung lig pain  No lifting over 10-15 pounds  AFP today  Sees MFM in 4 weeks

## 2024-12-16 LAB
AFP INTERP SERPL-IMP: ABNORMAL
AFP MOM SERPL: 2.56
AFP SERPL-MCNC: 76.6 NG/ML
AGE AT DELIVERY: 26.7 YR
AGE OF EGG DONOR: ABNORMAL
AGE OF EGG DONOR: ABNORMAL
COMMENT: ABNORMAL
DONOR EGG?: ABNORMAL
DONOR EGG?: NO
FAMILY HISTORY NTD: ABNORMAL
FHX NTD (Y OR N): NO
GA METHOD: ABNORMAL
GA: 17 WEEKS
IDDM PATIENT QL: NO
INSULIN DEP. DIABETIC: ABNORMAL
Lab: 219
Lab: ABNORMAL
Lab: NO
MAT SCN FOR FETAL ABNORMALITIES SERPL: ABNORMAL
MULTIPLE PREGNANCY: NO
NEURAL TUBE DEFECT RISK FETUS: 257
NUMBER OF FETUSES: NO
OTHER INDICATIONS: ABNORMAL
OTHER INDICATIONS: NO
PREVIOUSLY ELEVATED AFP (Y OR N): 17
PREVIOUSLY ELEVATED AFP (Y OR N): NO
PRIOR 1ST TRIM TESTING ?: NO
PRIOR 2ND TRIM TESTING ?: NO
PRIOR DS/NTD SCREEN CURRENT PREGNANCY?: ABNORMAL
PRIOR DS/NTD SCREEN CURRENT PREGNANCY?: NO
PRIOR FIRST TRIMESTER TESTING (Y OR N ): ABNORMAL
PRIOR PREGNANCY WITH DOWN SYNDROME (Y OR N): 1
PRIOR PREGNANCY WITH DOWN SYNDROME (Y OR N): NO
TYPE OF EGG DONOR: ABNORMAL
TYPE OF EGG DONOR: ABNORMAL

## 2024-12-23 NOTE — PROGRESS NOTES
EDC 2025 by US  NIPTS normal female  [facility]  CHTN - labetalol 100mg bid  PCR 0.1  Baby ASA  Needs varicella titer  Flu vaccine at work  Sister 32 weeks baby  anomalies  Obesity  MFM  Varicella immune  Previa - second trimester bleeding

## 2024-12-24 ENCOUNTER — LAB (OUTPATIENT)
Age: 24
End: 2024-12-24

## 2024-12-24 DIAGNOSIS — O09.90 SUPERVISION OF HIGH RISK PREGNANCY, ANTEPARTUM: Primary | ICD-10-CM

## 2024-12-24 DIAGNOSIS — O09.90 SUPERVISION OF HIGH RISK PREGNANCY, ANTEPARTUM: ICD-10-CM

## 2024-12-28 LAB
AFP INTERP SERPL-IMP: NORMAL
AFP MOM SERPL: 1.29
AFP SERPL-MCNC: 68.1 NG/ML
AGE AT DELIVERY: 26.6 YR
AGE OF EGG DONOR: NORMAL
AGE OF EGG DONOR: NORMAL
COMMENT: NORMAL
DONOR EGG?: NO
DONOR EGG?: NO
FAMILY HISTORY NTD: NORMAL
FHX NTD (Y OR N): NORMAL
GA METHOD: NORMAL
GA: 21.3 WEEKS
IDDM PATIENT QL: NO
INSULIN DEP. DIABETIC: NORMAL
Lab: 218
Lab: NO
Lab: NORMAL
MAT SCN FOR FETAL ABNORMALITIES SERPL: NORMAL
MULTIPLE PREGNANCY: NO
NEURAL TUBE DEFECT RISK FETUS: 4947
NUMBER OF FETUSES: NO
OTHER INDICATIONS: NO
OTHER INDICATIONS: NORMAL
PREVIOUSLY ELEVATED AFP (Y OR N): 18
PREVIOUSLY ELEVATED AFP (Y OR N): NO
PRIOR 1ST TRIM TESTING ?: NO
PRIOR 2ND TRIM TESTING ?: NO
PRIOR DS/NTD SCREEN CURRENT PREGNANCY?: NO
PRIOR DS/NTD SCREEN CURRENT PREGNANCY?: NORMAL
PRIOR FIRST TRIMESTER TESTING (Y OR N ): NORMAL
PRIOR PREGNANCY WITH DOWN SYNDROME (Y OR N): 1
PRIOR PREGNANCY WITH DOWN SYNDROME (Y OR N): NO
TYPE OF EGG DONOR: NORMAL
TYPE OF EGG DONOR: NORMAL

## 2025-01-02 ENCOUNTER — ROUTINE PRENATAL (OUTPATIENT)
Age: 25
End: 2025-01-02
Payer: COMMERCIAL

## 2025-01-02 ENCOUNTER — ROUTINE PRENATAL (OUTPATIENT)
Age: 25
End: 2025-01-02

## 2025-01-02 VITALS
WEIGHT: 219.6 LBS | DIASTOLIC BLOOD PRESSURE: 79 MMHG | RESPIRATION RATE: 20 BRPM | BODY MASS INDEX: 36.59 KG/M2 | SYSTOLIC BLOOD PRESSURE: 130 MMHG | HEART RATE: 81 BPM | HEIGHT: 65 IN

## 2025-01-02 VITALS — DIASTOLIC BLOOD PRESSURE: 82 MMHG | OXYGEN SATURATION: 95 % | SYSTOLIC BLOOD PRESSURE: 127 MMHG | HEART RATE: 87 BPM

## 2025-01-02 DIAGNOSIS — O16.9 HYPERTENSION AFFECTING PREGNANCY, ANTEPARTUM: ICD-10-CM

## 2025-01-02 DIAGNOSIS — Z3A.20 20 WEEKS GESTATION OF PREGNANCY: ICD-10-CM

## 2025-01-02 DIAGNOSIS — O09.90 SUPERVISION OF HIGH RISK PREGNANCY, ANTEPARTUM: Primary | ICD-10-CM

## 2025-01-02 DIAGNOSIS — O28.0 ABNORMAL MSAFP (MATERNAL SERUM ALPHA-FETOPROTEIN), ELEVATED: ICD-10-CM

## 2025-01-02 DIAGNOSIS — O44.02 PLACENTA PREVIA ANTEPARTUM IN SECOND TRIMESTER: ICD-10-CM

## 2025-01-02 DIAGNOSIS — O99.210 SEVERE OBESITY DUE TO EXCESS CALORIES AFFECTING PREGNANCY, ANTEPARTUM: ICD-10-CM

## 2025-01-02 DIAGNOSIS — E66.01 SEVERE OBESITY DUE TO EXCESS CALORIES AFFECTING PREGNANCY, ANTEPARTUM: ICD-10-CM

## 2025-01-02 DIAGNOSIS — O99.212 OBESITY AFFECTING PREGNANCY IN SECOND TRIMESTER, UNSPECIFIED OBESITY TYPE: Primary | ICD-10-CM

## 2025-01-02 DIAGNOSIS — O44.20 PARTIAL PLACENTA PREVIA: ICD-10-CM

## 2025-01-02 PROCEDURE — 99024 POSTOP FOLLOW-UP VISIT: CPT | Performed by: OBSTETRICS & GYNECOLOGY

## 2025-01-02 PROCEDURE — 76817 TRANSVAGINAL US OBSTETRIC: CPT | Performed by: OBSTETRICS & GYNECOLOGY

## 2025-01-02 PROCEDURE — 0502F SUBSEQUENT PRENATAL CARE: CPT | Performed by: OBSTETRICS & GYNECOLOGY

## 2025-01-02 PROCEDURE — 76811 OB US DETAILED SNGL FETUS: CPT | Performed by: OBSTETRICS & GYNECOLOGY

## 2025-01-02 NOTE — PROGRESS NOTES
Chief Complaint   Patient presents with    Pregnancy Ultrasound    New Patient        /82 (Site: Right Upper Arm, Position: Sitting, Cuff Size: Large Adult)   Pulse 87   LMP 07/28/2024 (Exact Date)   SpO2 95%     Pain Scale: 0 - No pain/10  Pain Location:      Current Outpatient Medications on File Prior to Visit   Medication Sig Dispense Refill    aspirin 81 MG chewable tablet Take 1 tablet by mouth daily      labetalol (NORMODYNE) 100 MG tablet Take 1 tablet by mouth 2 times daily 180 tablet 5    Prenatal Vit-Fe Fumarate-FA (PRENATAL VITAMIN) 27-0.8 MG TABS Take 1 tablet by mouth daily 90 tablet 5    Prenatal MV & Min w/FA-DHA (PRENATAL ADULT GUMMY/DHA/FA PO) Take by mouth (Patient not taking: Reported on 12/4/2024)       No current facility-administered medications on file prior to visit.       \"Have you been to the ER, urgent care clinic since your last visit?  Hospitalized since your last visit?\"    YES - When: approximately 1 months ago.  Where and Why: [facility].    “Have you seen or consulted any other health care providers outside of [facility] since your last visit?”    NO

## 2025-01-03 NOTE — PROCEDURES
PATIENT: NAVIN SHOOK   -  : 2000   -  DOS:2025   -  INTERPRETING PROVIDER:Levi Dennison,   Indication  ========    BMI, Hypertension    Method  ======    Transvaginal ultrasound examination. Transabdominal ultrasound examination. View: suboptimal due to maternal acoustic properties and fetal position    Dating  ======    LMP on: 2024  GA by LMP 22 w + 4 d  HOWIE by LMP: 2025  Previous Ultrasound on: 2024  Type of prior assessment: GA  GA at prior assessment date 6 w + 0 d  GA by previous U/S 20 w + 0 d  HOWIE by previous Ultrasound: 2025  Ultrasound examination on: 2025  GA by U/S based upon: AC, BPD, Femur, HC  GA by U/S 19 w + 4 d  HOWIE by U/S: 2025  Assigned: based on ultrasound (GA), selected on 2025  Assigned GA 20 w + 0 d  Assigned HOWIE: 2025    Fetal Growth Overview  =================    Exam date        GA              BPD (mm)          HC (mm)            AC (mm)               FL (mm)             HL (mm)            EFW (g)  2025          20w 0d        42.5    10%        162.5     8%        152.3    59%        31.4     33%        29.6    43%        324     44%    Fetal Biometry  ============    Standard  BPD 42.5 mm 18w 6d 10% Hadlock  OFD 59.3 mm 20w 4d 71% Ti  .5 mm 19w 0d 8% Hadlock  Cerebellum tr 20.4 mm 19w 4d 61% Hill  Nuchal fold 4.0 mm  .3 mm 20w 3d 59% Hadlock  Femur 31.4 mm 19w 5d 33% Hadlock  Humerus 29.6 mm 19w 5d 43% Ti   g 19w 6d 44% Hadlock  EFW (lb) 0 lb  EFW (oz) 11 oz  EFW by: Hadlock (BPD-HC-AC-FL)  Extended   6.1 mm  CM 5.3 mm  62% Nicolaides  Nasal bone 5.4 mm  Head / Face / Neck  Nasal bone: present  Other Structures   bpm    General Evaluation  ==============    Cardiac activity present.  bpm. Fetal movements: visualized. Presentation: BREECH  Placenta: Placental site: anterior, PREVIA  Umbilical cord: Cord vessels: 3 vessel cord. Insertion site: SUBOPTIMAL  Amniotic fluid: Amount of

## 2025-01-13 NOTE — PROGRESS NOTES
EDC 2025 by US  NIPTS normal female  [facility]  CHTN - labetalol 100mg bid  PCR 0.1  Baby ASA  Varicella immune  Flu vaccine at work  Sister 32 weeks baby  anomalies  Obesity  MFM  Varicella immune  Previa - second trimester bleeding

## 2025-01-20 ENCOUNTER — TELEPHONE (OUTPATIENT)
Age: 25
End: 2025-01-20

## 2025-01-20 NOTE — TELEPHONE ENCOUNTER
PT name and  verified    23 yo last ov 25, next ov 25  , 22w4d    PT calling stating she has not felt the baby move since Sat 25. PT states she has been hydrating, drank coffee, coke and lies on her L side and has not felt any movement.PT also complains of lower back pain, with shooting pain to her thigh.  Tylenol safe to take in pregnancy.  RN informed PT that  was in surgery and not sure her availability and would have to consult -work in.  RN consulted  and states that she can be put on the schedule and [facility] can do heart tones.  RN consulted [facility] and states they can put PT on schedule for 230 pm and would work her in and do heart tones.   RN went back to phone to inform PT and she had disconnected.  RN called PT back, name and  verified and relayed she could come in to get FHT checked and PT states that why she was waiting she ate a popsicle and felt baby move.  PT denies needing to come in and knows to call if she has any lof, vag bleeding ,and if in this circumstance, if she has eaten a popsicle or anything sweet and no movement.

## 2025-01-30 ENCOUNTER — ROUTINE PRENATAL (OUTPATIENT)
Age: 25
End: 2025-01-30

## 2025-01-30 VITALS
WEIGHT: 224 LBS | BODY MASS INDEX: 37.28 KG/M2 | SYSTOLIC BLOOD PRESSURE: 123 MMHG | HEART RATE: 74 BPM | DIASTOLIC BLOOD PRESSURE: 71 MMHG

## 2025-01-30 VITALS — SYSTOLIC BLOOD PRESSURE: 130 MMHG | DIASTOLIC BLOOD PRESSURE: 85 MMHG | HEART RATE: 78 BPM

## 2025-01-30 DIAGNOSIS — O99.210 SEVERE OBESITY DUE TO EXCESS CALORIES AFFECTING PREGNANCY, ANTEPARTUM: ICD-10-CM

## 2025-01-30 DIAGNOSIS — O09.90 SUPERVISION OF HIGH RISK PREGNANCY, ANTEPARTUM: Primary | ICD-10-CM

## 2025-01-30 DIAGNOSIS — E66.01 SEVERE OBESITY DUE TO EXCESS CALORIES AFFECTING PREGNANCY, ANTEPARTUM: ICD-10-CM

## 2025-01-30 DIAGNOSIS — O99.212 OBESITY AFFECTING PREGNANCY IN SECOND TRIMESTER, UNSPECIFIED OBESITY TYPE: Primary | ICD-10-CM

## 2025-01-30 DIAGNOSIS — O44.00 PLACENTA PREVIA ANTEPARTUM: ICD-10-CM

## 2025-01-30 DIAGNOSIS — Z3A.24 24 WEEKS GESTATION OF PREGNANCY: ICD-10-CM

## 2025-01-30 DIAGNOSIS — O16.9 HYPERTENSION AFFECTING PREGNANCY, ANTEPARTUM: ICD-10-CM

## 2025-01-30 PROCEDURE — 0502F SUBSEQUENT PRENATAL CARE: CPT | Performed by: OBSTETRICS & GYNECOLOGY

## 2025-01-30 NOTE — PROCEDURES
PATIENT: NAVIN SHOOK   -  : 2000   -  DOS:2025   -  INTERPRETING PROVIDER:Luma Schaeffer,   Indication  ========    Obesity in pregnancy, Hypertension    Method  ======    Transabdominal ultrasound examination. View: Sufficient    Pregnancy  =========    [place] pregnancy. Number of fetuses: 1    Dating  ======    LMP on: 2024  GA by LMP 26 w + 4 d  HOWIE by LMP: 2025  Previous Ultrasound on: 2024  Type of prior assessment: GA  GA at prior assessment date 6 w + 0 d  GA by previous U/S 24 w + 0 d  HOWIE by previous Ultrasound: 2025  Ultrasound examination on: 2025  GA by U/S based upon: AC, BPD, Femur, HC  GA by U/S 23 w + 0 d  HOWIE by U/S: 2025  Assigned: based on ultrasound (GA), selected on 2025  Assigned GA 24 w + 0 d  Assigned HOWIE: 2025    Fetal Biometry  ============    Standard  BPD 53.6 mm 22w 2d 3% Hadlock  OFD 74.7 mm 24w 5d 74% Ti  .9 mm 22w 4d 3% Hadlock  Cerebellum tr 26.5 mm 23w 5d 60% Hill  .6 mm 24w 0d 40% Hadlock  Femur 40.9 mm 23w 2d 17% Hadlock   g 23w 2d 21% Hadlock  EFW (lb) 1 lb  EFW (oz) 5 oz  EFW by: Hadlock (BPD-HC-AC-FL)  Extended   5.3 mm  CM 6.0 mm  51% Nicolaides  Other Structures   bpm    General Evaluation  ==============    Cardiac activity present.  bpm. Fetal movements: visualized. Presentation: Transverse, head to maternal left  Placenta: Placental site: anterior, PREVIA  Umbilical cord: Cord vessels: 3 vessel cord. Insertion site: possible velamentous insertion, SUBOPTIMAL  Amniotic fluid: Amount of AF: normal. MVP 5.4 cm    Fetal Anatomy  ===========    Choroid plexus: normal  Lips: normal  Profile: normal  Nose: normal  RVOT view: normal  Heart / Thorax  Rt lung: normal  Lt lung: normal  Diaphragm: normal  Stomach: normal  Kidneys: normal  Bladder: normal  Cervical spine: normal  Thoracic spine: normal  Lumbar spine: normal  Sacral spine: normal  Wants to know fetal

## 2025-01-30 NOTE — PROGRESS NOTES
Patient was seen 1/30/2025      Please look under media to view full consult and ultrasound report in ViewPoint.         Luam Schaeffer MD   Maternal Fetal Medicine

## 2025-02-17 NOTE — PROGRESS NOTES
EDC 2025 by US  NIPTS normal female  [facility]  CHTN - labetalol 100mg bid  PCR 0.1  Baby ASA  Varicella immune  Flu vaccine at work  Sister 32 weeks baby  anomalies  Obesity  MFM  Varicella immune  Previa - second trimester bleeding  Elevated AFP - repeat normal  After obtaining consent, and per orders of , injection of TDap given in Left deltoid by Dayana Krause LPN. Patient instructed to remain in clinic for 20 minutes afterwards, and to report any adverse reaction to me immediately. Lot: [id number] Exp: 27 NDC: 96151-878-94 , VIS given.

## 2025-02-27 ENCOUNTER — ROUTINE PRENATAL (OUTPATIENT)
Age: 25
End: 2025-02-27
Payer: COMMERCIAL

## 2025-02-27 VITALS
HEART RATE: 87 BPM | WEIGHT: 228.6 LBS | BODY MASS INDEX: 38.04 KG/M2 | SYSTOLIC BLOOD PRESSURE: 138 MMHG | DIASTOLIC BLOOD PRESSURE: 82 MMHG

## 2025-02-27 VITALS — SYSTOLIC BLOOD PRESSURE: 138 MMHG | HEART RATE: 76 BPM | DIASTOLIC BLOOD PRESSURE: 88 MMHG

## 2025-02-27 DIAGNOSIS — O10.919 CHRONIC HYPERTENSION AFFECTING PREGNANCY: ICD-10-CM

## 2025-02-27 DIAGNOSIS — O44.03 PLACENTA PREVIA ANTEPARTUM IN THIRD TRIMESTER: ICD-10-CM

## 2025-02-27 DIAGNOSIS — Z36.9 ENCOUNTER FOR ANTENATAL SCREENING OF MOTHER: ICD-10-CM

## 2025-02-27 DIAGNOSIS — E66.9 OBESITY (BMI 30-39.9): ICD-10-CM

## 2025-02-27 DIAGNOSIS — E66.01 SEVERE OBESITY DUE TO EXCESS CALORIES AFFECTING PREGNANCY, ANTEPARTUM: ICD-10-CM

## 2025-02-27 DIAGNOSIS — O28.0 ABNORMAL MSAFP (MATERNAL SERUM ALPHA-FETOPROTEIN), ELEVATED: ICD-10-CM

## 2025-02-27 DIAGNOSIS — O44.00 PLACENTA PREVIA ANTEPARTUM: ICD-10-CM

## 2025-02-27 DIAGNOSIS — O16.9 HYPERTENSION AFFECTING PREGNANCY, ANTEPARTUM: ICD-10-CM

## 2025-02-27 DIAGNOSIS — Z23 NEED FOR VACCINATION: ICD-10-CM

## 2025-02-27 DIAGNOSIS — O09.90 SUPERVISION OF HIGH RISK PREGNANCY, ANTEPARTUM: Primary | ICD-10-CM

## 2025-02-27 DIAGNOSIS — Z3A.28 28 WEEKS GESTATION OF PREGNANCY: ICD-10-CM

## 2025-02-27 DIAGNOSIS — O99.210 SEVERE OBESITY DUE TO EXCESS CALORIES AFFECTING PREGNANCY, ANTEPARTUM: ICD-10-CM

## 2025-02-27 PROCEDURE — 0502F SUBSEQUENT PRENATAL CARE: CPT | Performed by: OBSTETRICS & GYNECOLOGY

## 2025-02-27 PROCEDURE — 76816 OB US FOLLOW-UP PER FETUS: CPT | Performed by: STUDENT IN AN ORGANIZED HEALTH CARE EDUCATION/TRAINING PROGRAM

## 2025-02-27 PROCEDURE — 90715 TDAP VACCINE 7 YRS/> IM: CPT | Performed by: OBSTETRICS & GYNECOLOGY

## 2025-02-27 PROCEDURE — 76817 TRANSVAGINAL US OBSTETRIC: CPT | Performed by: STUDENT IN AN ORGANIZED HEALTH CARE EDUCATION/TRAINING PROGRAM

## 2025-02-27 PROCEDURE — 90471 IMMUNIZATION ADMIN: CPT | Performed by: OBSTETRICS & GYNECOLOGY

## 2025-02-27 PROCEDURE — 99214 OFFICE O/P EST MOD 30 MIN: CPT | Performed by: STUDENT IN AN ORGANIZED HEALTH CARE EDUCATION/TRAINING PROGRAM

## 2025-02-27 NOTE — PROGRESS NOTES
Saw ARJUN. Still has previa  Glucola, tdap today  Rh pos  Disc consent  Post CS for 36 weeks  BP stable

## 2025-02-27 NOTE — PROGRESS NOTES
Provided verbal communication on fetal movement counts, labor precautions, and signs/symptoms of pre-eclampsia.  Patient verbalized understanding of the information shared.  All patient questions were addressed.     Patient unable to take blood pressure medication before appointment this morning. Patient denies signs/symptoms of pre-e at this time.

## 2025-02-27 NOTE — PROCEDURES
PATIENT: NAVIN SHOOK   -  : 2000   -  DOS:2025   -  INTERPRETING PROVIDER:Luma Schaeffer,   Indication  ========    Obesity in pregnancy, Hypertension    Method  ======    Transabdominal and transvaginal ultrasound examination. View: Good view    Pregnancy  =========    Quiroga pregnancy. Number of fetuses: 1    Dating  ======    LMP on: 2024  GA by LMP 30 w + 4 d  HOWIE by LMP: 2025  Previous Ultrasound on: 2024  Type of prior assessment: GA  GA at prior assessment date 6 w + 0 d  GA by previous U/S 28 w + 0 d  HOWIE by previous Ultrasound: 2025  Ultrasound examination on: 2025  GA by U/S based upon: AC, BPD, Femur, HC  GA by U/S 26 w + 5 d  HOWIE by U/S: 2025  Assigned: based on ultrasound (GA), selected on 2025  Assigned GA 28 w + 0 d  Assigned HOWIE: 2025    Fetal Biometry  ============    Standard  BPD 63.2 mm 25w 4d <1% Hadlock  OFD 90.0 mm 29w 0d 76% Ti  .6 mm 26w 5d 2% Hadlock  .5 mm 27w 4d 30% Hadlock  Femur 50.2 mm 27w 0d 11% Hadlock  EFW 1,036 g 26w 6d 14% Hadlock  EFW (lb) 2 lb  EFW (oz) 5 oz  EFW by: Hadlock (BPD-HC-AC-FL)  Extended   3.5 mm  Other Structures   bpm    General Evaluation  ==============    Cardiac activity present.  bpm. Fetal movements: visualized. Presentation: Cephalic  Placenta: Placental site: anterior, PREVIA  Umbilical cord: Cord vessels: 3 vessel cord. Insertion site: possible velamentous insertion, SUBOPTIMAL  Amniotic fluid: Amount of AF: normal. MVP 6.3 cm. TERESA 19.9 cm. Q1 6.3 cm, Q2 5.3 cm, Q3 3.9 cm, Q4 4.4 cm    Fetal Anatomy  ===========    Stomach: normal  Kidneys: normal  Bladder: normal  Wants to know fetal sex: yes    Maternal Structures  ===============    Uterus / Cervix  Approach: Transvaginal  Cervical length 3.30 cm  Other: assess the inferior placental edge    Findings  =======    Intrauterine Quiroga pregnancy at 28w 0d by clinical dates.  EFW is 1036 g at 14%, abdominal

## 2025-02-27 NOTE — PROGRESS NOTES
Patient was seen 2/27/2025      Please look under media to view full consult and ultrasound report in ViewPoint.         Luma Schaeffer MD   Maternal Fetal Medicine

## 2025-02-28 LAB
ERYTHROCYTE [DISTWIDTH] IN BLOOD BY AUTOMATED COUNT: 13.1 % (ref 11.7–15.4)
GLUCOSE 1H P 50 G GLC PO SERPL-MCNC: 165 MG/DL (ref 70–139)
HCT VFR BLD AUTO: 36.7 % (ref 34–46.6)
HGB BLD-MCNC: 12 G/DL (ref 11.1–15.9)
MCH RBC QN AUTO: 29.3 PG (ref 26.6–33)
MCHC RBC AUTO-ENTMCNC: 32.7 G/DL (ref 31.5–35.7)
MCV RBC AUTO: 90 FL (ref 79–97)
PLATELET # BLD AUTO: 294 X10E3/UL (ref 150–450)
RBC # BLD AUTO: 4.1 X10E6/UL (ref 3.77–5.28)
WBC # BLD AUTO: 11.1 X10E3/UL (ref 3.4–10.8)

## 2025-03-12 ENCOUNTER — ROUTINE PRENATAL (OUTPATIENT)
Age: 25
End: 2025-03-12

## 2025-03-12 ENCOUNTER — HOSPITAL ENCOUNTER (OUTPATIENT)
Facility: HOSPITAL | Age: 25
Setting detail: OBSERVATION
Discharge: HOME | End: 2025-03-14
Attending: OBSTETRICS & GYNECOLOGY | Admitting: OBSTETRICS & GYNECOLOGY
Payer: COMMERCIAL

## 2025-03-12 ENCOUNTER — TELEPHONE (OUTPATIENT)
Age: 25
End: 2025-03-12

## 2025-03-12 VITALS
HEART RATE: 87 BPM | WEIGHT: 229.2 LBS | SYSTOLIC BLOOD PRESSURE: 136 MMHG | BODY MASS INDEX: 38.14 KG/M2 | DIASTOLIC BLOOD PRESSURE: 87 MMHG

## 2025-03-12 VITALS — HEART RATE: 83 BPM | SYSTOLIC BLOOD PRESSURE: 138 MMHG | DIASTOLIC BLOOD PRESSURE: 86 MMHG

## 2025-03-12 DIAGNOSIS — E66.01 SEVERE OBESITY DUE TO EXCESS CALORIES AFFECTING PREGNANCY, ANTEPARTUM: ICD-10-CM

## 2025-03-12 DIAGNOSIS — O16.9 HYPERTENSION AFFECTING PREGNANCY, ANTEPARTUM: ICD-10-CM

## 2025-03-12 DIAGNOSIS — Z34.90 PREGNANCY, UNSPECIFIED GESTATIONAL AGE: Primary | ICD-10-CM

## 2025-03-12 DIAGNOSIS — O99.210 SEVERE OBESITY DUE TO EXCESS CALORIES AFFECTING PREGNANCY, ANTEPARTUM: ICD-10-CM

## 2025-03-12 DIAGNOSIS — O44.00 PLACENTA PREVIA ANTEPARTUM: ICD-10-CM

## 2025-03-12 DIAGNOSIS — Z34.90 PREGNANCY, UNSPECIFIED GESTATIONAL AGE: ICD-10-CM

## 2025-03-12 DIAGNOSIS — O09.90 SUPERVISION OF HIGH RISK PREGNANCY, ANTEPARTUM: Primary | ICD-10-CM

## 2025-03-12 DIAGNOSIS — Z3A.29 29 WEEKS GESTATION OF PREGNANCY: ICD-10-CM

## 2025-03-12 LAB
ABO + RH BLD: NORMAL
ALBUMIN SERPL-MCNC: 3.2 G/DL (ref 3.5–5)
ALBUMIN/GLOB SERPL: 0.9 (ref 1.1–2.2)
ALP SERPL-CCNC: 90 U/L (ref 45–117)
ALT SERPL-CCNC: 19 U/L (ref 12–78)
ANION GAP SERPL CALC-SCNC: 9 MMOL/L (ref 2–12)
AST SERPL-CCNC: 15 U/L (ref 15–37)
BILIRUB SERPL-MCNC: 0.6 MG/DL (ref 0.2–1)
BLOOD GROUP ANTIBODIES SERPL: NORMAL
BUN SERPL-MCNC: 8 MG/DL (ref 6–20)
BUN/CREAT SERPL: 14 (ref 12–20)
CALCIUM SERPL-MCNC: 10.1 MG/DL (ref 8.5–10.1)
CHLORIDE SERPL-SCNC: 105 MMOL/L (ref 97–108)
CO2 SERPL-SCNC: 23 MMOL/L (ref 21–32)
COMMENT:: NORMAL
CREAT SERPL-MCNC: 0.59 MG/DL (ref 0.55–1.02)
CREAT UR-MCNC: 47 MG/DL
ERYTHROCYTE [DISTWIDTH] IN BLOOD BY AUTOMATED COUNT: 13.3 % (ref 11.5–14.5)
GLOBULIN SER CALC-MCNC: 3.6 G/DL (ref 2–4)
GLUCOSE SERPL-MCNC: 85 MG/DL (ref 65–100)
HCT VFR BLD AUTO: 36.3 % (ref 35–47)
HGB BLD-MCNC: 12.2 G/DL (ref 11.5–16)
MCH RBC QN AUTO: 29.1 PG (ref 26–34)
MCHC RBC AUTO-ENTMCNC: 33.6 G/DL (ref 30–36.5)
MCV RBC AUTO: 86.6 FL (ref 80–99)
NRBC # BLD: 0 K/UL (ref 0–0.01)
NRBC BLD-RTO: 0 PER 100 WBC
PLATELET # BLD AUTO: 310 K/UL (ref 150–400)
PMV BLD AUTO: 10.5 FL (ref 8.9–12.9)
POTASSIUM SERPL-SCNC: 4 MMOL/L (ref 3.5–5.1)
PROT SERPL-MCNC: 6.8 G/DL (ref 6.4–8.2)
PROT UR-MCNC: 12 MG/DL (ref 0–11.9)
PROT/CREAT UR-RTO: 0.3
RBC # BLD AUTO: 4.19 M/UL (ref 3.8–5.2)
SODIUM SERPL-SCNC: 137 MMOL/L (ref 136–145)
SPECIMEN EXP DATE BLD: NORMAL
SPECIMEN HOLD: NORMAL
WBC # BLD AUTO: 12.3 K/UL (ref 3.6–11)

## 2025-03-12 PROCEDURE — 59025 FETAL NON-STRESS TEST: CPT

## 2025-03-12 PROCEDURE — 80053 COMPREHEN METABOLIC PANEL: CPT

## 2025-03-12 PROCEDURE — 84156 ASSAY OF PROTEIN URINE: CPT

## 2025-03-12 PROCEDURE — G0378 HOSPITAL OBSERVATION PER HR: HCPCS

## 2025-03-12 PROCEDURE — G0379 DIRECT REFER HOSPITAL OBSERV: HCPCS

## 2025-03-12 PROCEDURE — 36415 COLL VENOUS BLD VENIPUNCTURE: CPT

## 2025-03-12 PROCEDURE — 85027 COMPLETE CBC AUTOMATED: CPT

## 2025-03-12 PROCEDURE — 0502F SUBSEQUENT PRENATAL CARE: CPT | Performed by: OBSTETRICS & GYNECOLOGY

## 2025-03-12 PROCEDURE — 82570 ASSAY OF URINE CREATININE: CPT

## 2025-03-12 PROCEDURE — 2580000003 HC RX 258: Performed by: OBSTETRICS & GYNECOLOGY

## 2025-03-12 PROCEDURE — 86901 BLOOD TYPING SEROLOGIC RH(D): CPT

## 2025-03-12 PROCEDURE — 99212 OFFICE O/P EST SF 10 MIN: CPT

## 2025-03-12 PROCEDURE — 86900 BLOOD TYPING SEROLOGIC ABO: CPT

## 2025-03-12 PROCEDURE — 86850 RBC ANTIBODY SCREEN: CPT

## 2025-03-12 PROCEDURE — 99221 1ST HOSP IP/OBS SF/LOW 40: CPT | Performed by: OBSTETRICS & GYNECOLOGY

## 2025-03-12 RX ORDER — SODIUM CHLORIDE, SODIUM LACTATE, POTASSIUM CHLORIDE, AND CALCIUM CHLORIDE .6; .31; .03; .02 G/100ML; G/100ML; G/100ML; G/100ML
1000 INJECTION, SOLUTION INTRAVENOUS ONCE
Status: COMPLETED | OUTPATIENT
Start: 2025-03-12 | End: 2025-03-12

## 2025-03-12 RX ORDER — ONDANSETRON 2 MG/ML
4 INJECTION INTRAMUSCULAR; INTRAVENOUS EVERY 6 HOURS PRN
Status: DISCONTINUED | OUTPATIENT
Start: 2025-03-12 | End: 2025-03-14 | Stop reason: HOSPADM

## 2025-03-12 RX ORDER — LABETALOL 100 MG/1
100 TABLET, FILM COATED ORAL EVERY 12 HOURS SCHEDULED
Status: DISCONTINUED | OUTPATIENT
Start: 2025-03-13 | End: 2025-03-12

## 2025-03-12 RX ORDER — LABETALOL 100 MG/1
100 TABLET, FILM COATED ORAL EVERY 8 HOURS SCHEDULED
Status: DISCONTINUED | OUTPATIENT
Start: 2025-03-13 | End: 2025-03-14 | Stop reason: HOSPADM

## 2025-03-12 RX ORDER — ACETAMINOPHEN 500 MG
1000 TABLET ORAL EVERY 8 HOURS SCHEDULED
Status: DISCONTINUED | OUTPATIENT
Start: 2025-03-12 | End: 2025-03-14 | Stop reason: HOSPADM

## 2025-03-12 RX ORDER — ONDANSETRON 4 MG/1
4 TABLET, ORALLY DISINTEGRATING ORAL EVERY 8 HOURS PRN
Status: DISCONTINUED | OUTPATIENT
Start: 2025-03-12 | End: 2025-03-14 | Stop reason: HOSPADM

## 2025-03-12 RX ADMIN — SODIUM CHLORIDE, POTASSIUM CHLORIDE, SODIUM LACTATE AND CALCIUM CHLORIDE 1000 ML: 600; 310; 30; 20 INJECTION, SOLUTION INTRAVENOUS at 19:13

## 2025-03-12 NOTE — PROCEDURES
the patient. All  questions and concerns addressed.    Consultation  ==========    NAVIN is 24 yrs of age,  at 29w 6d.    Patient presents today while en route to L & D. She has noted the onset of lower abdominal cramping since yesterday and had the passage of mucusy material/brownish  fluid. She denies overt fluid leakage per se. Denies headache, visual changes, RUQ/epigastric pain.    Chronic Hypertension  - Recommend patient have a BP monitor at home and routinely check blood pressures. Discussed range at which to contact OB/MFM office and reviewed signs/symptoms  of preE  - Current medications: labetalol 100mg BID  -BP today: 138/86  -Takes BPs at work and notes all < 140/90  - Serial growth scans q4 weeks starting at 28 weeks  - Weekly ANT starting 32 weeks  - Delivery: 37.0-39.6 ,    Maternal Obesity (Pre-Pregnancy BMI 36):  - Recommend baseline labs: A1C, CBC, CMP and consider early GTT testing  - Initiate ldASA for preeclampsia prophylaxis. Can discontinue in the postpartum period, no known NSAID allergy.  - Recommend serial growth scans q4 starting at 32 weeks  -  testing weekly starting at 36 weeks  - Delivery: 39.0-40.6.    Pregnancies with unexplained elevated msAFP levels (>2.5 MoM's)  - NIPT normal, repeat msAFP normal;  - new ACOG guidelines recommend against ANT for isolated elevated msAFP    Probable placental abruption  -Reviewed images from last scan on  and this shows an area of hypodensity surrounded by area of echogenicity, extending down from inferior placental edge that  covers internal os. This area is different from placental tissue, which is not previa. A similar finding is seen today/.  -Cervix has fluid within canal  -Reviewed risks for abruption are HTN and elevated MSAFP 2.56 Mom    Recommendations  ==============    Patient referred to L & D for (reviewed with Dr. Wilkerson via phone call)  (1) Continuous monitoring, rule out PTL (can check cervix as no

## 2025-03-12 NOTE — PROGRESS NOTES
1719: Pt arrives on L&D after [facility] appt for vaginal bleeding that she had today. Pt had history of placenta previa earlier in the pregnancy that has since resolved so she wanted to get checked out just incase. Pt was sent down from [facility] for pre e r/o and continuous monitoring. Pt has fetal movement and denies loss of fluid. Pt having some brown colored blood come out at this time. Pt has history of CHTN and is on labetalol 100 mg BID. Pt states she took her morning dose. Pt denies headache, blurry vision or chest pain.     1746: Pt placed on NST.    1828: MD Wilkerson at bedside to see patient at this time.     1830: Reactive NST noted.    1833: MD states patient can come off EFM monitoring at this time and just be TOCO monitored to see if she is sina d/t patient feeling come cramping.     1835: Pt taking her home labetalol at this time. MD Wilkerson states patient can take her home labetalol and MD will order script for her to get in the am.     1900: Bedside and Verbal shift change report given to JESSIE Pickens RN (oncoming nurse) by DARLENE Salas RN (offgoing nurse). Report included the following information Nurse Handoff Report, Index, Adult Overview, Intake/Output, MAR, Recent Results, and Event Log.

## 2025-03-12 NOTE — TELEPHONE ENCOUNTER
Eveline Wilkerson MD Physician Signed 9:50 AM     Copy     Yes see me today. Has a previa       Patient advised of MD recommendations and was placed on the schedule to be seen in the office today at 2:10 pm  ok per KK    Patient verbalized understanding.

## 2025-03-12 NOTE — PROGRESS NOTES
Patient was seen 3/12/2025      Please look under media to view full consult and ultrasound report in ViewPoint.         Victor M Taylor MD  Maternal Fetal Medicine

## 2025-03-12 NOTE — H&P
History & Physical    Name: Loni Jain MRN: [id number]  SSN: [id number]    YOB: 2000  Age: 24 y.o.  Sex: female        Subjective:     Estimated Date of Delivery: 25  OB History          2    Para        Term                AB   1    Living             SAB   1    IAB        Ectopic        Molar        Multiple        Live Births                    Ms. Jain is admitted with pregnancy at 29w6d for  bleeding . Prenatal course significant for hx of bleeding since second trimester - dx with placenta previa. Saw streak of blood in mucous ?mucous plug. Lower abdominal pain off and on - stops her in her tracks, Happens <1/hour. Please see prenatal records for details.She is not currently bleeding.    Seen by MFM today - reevaluated and think this is a marginal abruption not a previa    Past Medical History:   Diagnosis Date    Papanicolaou smear for cervical cancer screening 10/29/2024    Normal     Past Surgical History:   Procedure Laterality Date    WISDOM TOOTH EXTRACTION       Social History     Occupational History    Not on file   Tobacco Use    Smoking status: Never    Smokeless tobacco: Never   Vaping Use    Vaping status: Never Used   Substance and Sexual Activity    Alcohol use: Not Currently    Drug use: Never    Sexual activity: Yes     Partners: Male     Family History   Problem Relation Age of Onset    Migraines Mother     Diabetes Paternal Grandmother        Allergies   Allergen Reactions    Citrus Hives    Molds & Smuts      Prior to Admission medications    Medication Sig Start Date End Date Taking? Authorizing Provider   aspirin 81 MG chewable tablet Take 1 tablet by mouth daily    Glenn Franklin MD   labetalol (NORMODYNE) 100 MG tablet Take 1 tablet by mouth 2 times daily 24   Eveline Wilkerson MD   Prenatal MV & Min w/FA-DHA (PRENATAL ADULT GUMMY/DHA/FA PO) Take by mouth  Patient not taking: Reported on 3/12/2025    Glenn Franklin MD    Prenatal Vit-Fe Fumarate-FA (PRENATAL VITAMIN) 27-0.8 MG TABS Take 1 tablet by mouth daily 10/29/24   Eveline Wilkerson MD        Review of Systems: Pertinent items are noted in HPI.    Objective:     Vitals:  There were no vitals filed for this visit.     Physical Exam:  regular rate and rhythm, no murmurs  Clear To Auscultation bilaterally - no wheezes, rales, rhonchi, or crackles  Cervix long on US today  Fetal Heart Rate: Reactive    Assessment/Plan:     Plan: Admit for bleeding. Hx of CHTN. ?marginal abruption. Will check labs and serial BP. Cervix long on US.       Signed By:  Eveline Wilkerson MD     March 12, 2025

## 2025-03-12 NOTE — TELEPHONE ENCOUNTER
Two patient identifiers used    24 year old patient  29w6d pregnant.    Patient calling to say that she went to the rest room this am and notice a gooey yellow brownish discharge with slight blood tinge when wiping.    Patient reports she had abdominal cramping yesterday but none today . Patient denies recent intercourse and states she is drinking water.    Patient denies other vaginal bleeding,no abdominal cramping today , rom and is feeling the baby move.    Patient advised to increase po fluids,monitor her symptoms and call back prn.    Patient does have ov appointment tomorrow at 1:50 pm    ? Do you want to see her today for ov instead      Please advise    Thank you

## 2025-03-12 NOTE — PROGRESS NOTES
Patient complains of spotting in mucous last couple days  Pain in lower abdomen <few times an hour. Stops her in her tracks though  Baby moving  Denies contractions  To L&D for observation due to previa

## 2025-03-12 NOTE — PROGRESS NOTES
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

## 2025-03-13 LAB
ALBUMIN SERPL-MCNC: 2.8 G/DL (ref 3.5–5)
ALBUMIN/GLOB SERPL: 0.8 (ref 1.1–2.2)
ALP SERPL-CCNC: 77 U/L (ref 45–117)
ALT SERPL-CCNC: 17 U/L (ref 12–78)
ANION GAP SERPL CALC-SCNC: 8 MMOL/L (ref 2–12)
APPEARANCE UR: CLEAR
AST SERPL-CCNC: 13 U/L (ref 15–37)
BACTERIA URNS QL MICRO: NEGATIVE /HPF
BILIRUB SERPL-MCNC: 0.5 MG/DL (ref 0.2–1)
BILIRUB UR QL: NEGATIVE
BUN SERPL-MCNC: 8 MG/DL (ref 6–20)
BUN/CREAT SERPL: 14 (ref 12–20)
CALCIUM SERPL-MCNC: 9.5 MG/DL (ref 8.5–10.1)
CHLORIDE SERPL-SCNC: 106 MMOL/L (ref 97–108)
CO2 SERPL-SCNC: 24 MMOL/L (ref 21–32)
COLOR UR: ABNORMAL
CREAT SERPL-MCNC: 0.58 MG/DL (ref 0.55–1.02)
CREAT UR-MCNC: 40 MG/DL
EPITH CASTS URNS QL MICRO: ABNORMAL /LPF
ERYTHROCYTE [DISTWIDTH] IN BLOOD BY AUTOMATED COUNT: 13.3 % (ref 11.5–14.5)
GLOBULIN SER CALC-MCNC: 3.3 G/DL (ref 2–4)
GLUCOSE SERPL-MCNC: 89 MG/DL (ref 65–100)
GLUCOSE UR STRIP.AUTO-MCNC: NEGATIVE MG/DL
HCT VFR BLD AUTO: 35.2 % (ref 35–47)
HGB BLD-MCNC: 11.6 G/DL (ref 11.5–16)
HGB UR QL STRIP: ABNORMAL
HYALINE CASTS URNS QL MICRO: ABNORMAL /LPF (ref 0–2)
KETONES UR QL STRIP.AUTO: NEGATIVE MG/DL
LEUKOCYTE ESTERASE UR QL STRIP.AUTO: NEGATIVE
MCH RBC QN AUTO: 29.2 PG (ref 26–34)
MCHC RBC AUTO-ENTMCNC: 33 G/DL (ref 30–36.5)
MCV RBC AUTO: 88.7 FL (ref 80–99)
NITRITE UR QL STRIP.AUTO: NEGATIVE
NRBC # BLD: 0 K/UL (ref 0–0.01)
NRBC BLD-RTO: 0 PER 100 WBC
PH UR STRIP: 6.5 (ref 5–8)
PLATELET # BLD AUTO: 269 K/UL (ref 150–400)
PMV BLD AUTO: 10.3 FL (ref 8.9–12.9)
POTASSIUM SERPL-SCNC: 3.8 MMOL/L (ref 3.5–5.1)
PROT SERPL-MCNC: 6.1 G/DL (ref 6.4–8.2)
PROT UR STRIP-MCNC: NEGATIVE MG/DL
PROT UR-MCNC: 8 MG/DL (ref 0–11.9)
PROT/CREAT UR-RTO: 0.2
RBC # BLD AUTO: 3.97 M/UL (ref 3.8–5.2)
RBC #/AREA URNS HPF: ABNORMAL /HPF (ref 0–5)
SODIUM SERPL-SCNC: 138 MMOL/L (ref 136–145)
SP GR UR REFRACTOMETRY: 1.01 (ref 1–1.03)
URINE CULTURE IF INDICATED: ABNORMAL
UROBILINOGEN UR QL STRIP.AUTO: 0.2 EU/DL (ref 0.2–1)
WBC # BLD AUTO: 10.4 K/UL (ref 3.6–11)
WBC URNS QL MICRO: ABNORMAL /HPF (ref 0–4)

## 2025-03-13 PROCEDURE — 82570 ASSAY OF URINE CREATININE: CPT

## 2025-03-13 PROCEDURE — 94761 N-INVAS EAR/PLS OXIMETRY MLT: CPT

## 2025-03-13 PROCEDURE — 85027 COMPLETE CBC AUTOMATED: CPT

## 2025-03-13 PROCEDURE — 84156 ASSAY OF PROTEIN URINE: CPT

## 2025-03-13 PROCEDURE — 36415 COLL VENOUS BLD VENIPUNCTURE: CPT

## 2025-03-13 PROCEDURE — G0378 HOSPITAL OBSERVATION PER HR: HCPCS

## 2025-03-13 PROCEDURE — 80053 COMPREHEN METABOLIC PANEL: CPT

## 2025-03-13 PROCEDURE — 99231 SBSQ HOSP IP/OBS SF/LOW 25: CPT | Performed by: OBSTETRICS & GYNECOLOGY

## 2025-03-13 PROCEDURE — 6370000000 HC RX 637 (ALT 250 FOR IP): Performed by: OBSTETRICS & GYNECOLOGY

## 2025-03-13 PROCEDURE — 81001 URINALYSIS AUTO W/SCOPE: CPT

## 2025-03-13 PROCEDURE — 59025 FETAL NON-STRESS TEST: CPT

## 2025-03-13 RX ADMIN — LABETALOL HYDROCHLORIDE 100 MG: 100 TABLET, FILM COATED ORAL at 21:37

## 2025-03-13 RX ADMIN — LABETALOL HYDROCHLORIDE 100 MG: 100 TABLET, FILM COATED ORAL at 14:50

## 2025-03-13 RX ADMIN — LABETALOL HYDROCHLORIDE 100 MG: 100 TABLET, FILM COATED ORAL at 05:58

## 2025-03-13 NOTE — PROGRESS NOTES
1900: Report received from YVROSE Salas RN, POC discussed, patient care assumed at this time.  Patient endorses positive fetal movement, patient states spotting remains minimal and dark brown. Patient aware to notify this RN if bleeding increases/if it becomes bright red    2039: All of patients vitals reviewed with Dr. Smith, all labs reviewed with Dr. Smith, orders received for repeat labs in the morning. MD stated depending on BP's overnight he might increase labetalol dose.     2250: FHR reviewed with Fátima LAO, NST reactive, patient removed from [facility].     0250: Call to Dr. Smith regarding pt waking up with sharp R sided pain. Pain resolved by the time RN in room, pt states ctx are around q 5 min and she is unsure if its cramping or ctx. Orders received to adjust toco. Patient endorses positive fetal movement.     0336: RN remained bedside palpating abdomen for over 30 min. Patient verbalizing uterine tightening q 3-20 minutes. Slight tightening palpated but abdomen overall soft. Patient rating them a 2/10 and declining tylenol for pain. Orders received to send a UA. Second RN at bedside to adjust toco.Patient aware to call out if they intensify. Patient states she is having no vaginal bleeding at this time.      0518: Reviewed UA with Dr. Smith. Reviewed BP's throughout the evening, orders received to administer labetalol at 0600 if BP's are 120's or above.     0713: Report given to Akila LAO, POC discussed, patient care transferred at this time.

## 2025-03-13 NOTE — PROGRESS NOTES
Patient reports bleeding has improved  No visual changes, HA, CP, SOB or epigastric pain/    Physical Exam  Constitutional:       General: She is not in acute distress.  Neurological:      Mental Status: She is alert.       Recent Labs     03/12/25  1800   WBC 12.3*   HGB 12.2         K 4.0      CO2 23   BUN 8   CREATININE 0.59   GLUCOSE 85   ALT 19   AST 15   PROCRERATURR 0.3     Patient with hypertension and new onset proteinuria, but ? Blood in the specimen  Will follow BP's and repeat labs in AM before calling it new onset proteinuria and superimposed preeclampsia

## 2025-03-13 NOTE — PROGRESS NOTES
Ante Partum Progress Note    Loni Jain  30w0d        Patient states she has no new complaints Small brown spotting today, no red. Occ cramping - better than yesterday    Vitals:  Vitals:  Patient Vitals for the past 24 hrs:   BP Temp Temp src Pulse Resp SpO2   25 1451 126/76 -- -- 79 15 98 %   25 1127 130/79 98.7 °F (37.1 °C) Oral 76 15 96 %   25 0915 136/81 99.2 °F (37.3 °C) Oral 84 15 96 %   25 0558 132/68 98.6 °F (37 °C) Oral 89 -- --   25 0237 122/80 98.6 °F (37 °C) Oral 76 -- --   25 0022 125/73 -- -- 72 -- --   25 2200 127/80 -- -- -- -- --   25 (!) 140/80 -- -- -- -- --   25 1935 138/81 -- -- -- -- --   25 1920 129/81 -- -- -- -- --   25 1850 137/88 -- -- 85 16 99 %   25 1834 (!) 152/92 -- -- 84 14 98 %   25 1820 134/81 -- -- 83 16 99 %   25 1804 (!) 146/89 -- -- 81 17 99 %     Temp (24hrs), Av.8 °F (37.1 °C), Min:98.6 °F (37 °C), Max:99.2 °F (37.3 °C)      Last 24hr Input/Output:  No intake or output data in the 24 hours ending 25 1800     Non stress test:  Reactive    Uterine Activity: None     Exam:  Patient without distress.     Abdomen, fundus soft non-tender     Extremities, no redness or tenderness                   Labs:     Lab Results   Component Value Date/Time    WBC 10.4 2025 05:53 AM    WBC 12.3 2025 06:00 PM    WBC 11.1 2025 12:00 AM    WBC 11.4 2024 08:38 PM    WBC 10.1 2024 03:16 PM    WBC 9.3 2024 09:46 AM    HGB 11.6 2025 05:53 AM    HGB 12.2 2025 06:00 PM    HGB 12.0 2025 12:00 AM    HGB 12.1 2024 08:38 PM    HGB 12.6 2024 03:16 PM    HGB 13.8 2024 09:46 AM    HCT 35.2 2025 05:53 AM    HCT 36.3 2025 06:00 PM    HCT 36.7 2025 12:00 AM    HCT 35.4 2024 08:38 PM    HCT 37.9 2024 03:16 PM    HCT 40.3 2024 09:46 AM     2025 05:53 AM     2025 06:00 PM    PLT  Sodium 138 136 - 145 mmol/L    Potassium 3.8 3.5 - 5.1 mmol/L    Chloride 106 97 - 108 mmol/L    CO2 24 21 - 32 mmol/L    Anion Gap 8 2 - 12 mmol/L    Glucose 89 65 - 100 mg/dL    BUN 8 6 - 20 MG/DL    Creatinine 0.58 0.55 - 1.02 MG/DL    BUN/Creatinine Ratio 14 12 - 20      Est, Glom Filt Rate >90 >60 ml/min/1.73m2    Calcium 9.5 8.5 - 10.1 MG/DL    Total Bilirubin 0.5 0.2 - 1.0 MG/DL    ALT 17 12 - 78 U/L    AST 13 (L) 15 - 37 U/L    Alk Phosphatase 77 45 - 117 U/L    Total Protein 6.1 (L) 6.4 - 8.2 g/dL    Albumin 2.8 (L) 3.5 - 5.0 g/dL    Globulin 3.3 2.0 - 4.0 g/dL    Albumin/Globulin Ratio 0.8 (L) 1.1 - 2.2         Assessment: 30w0d   CHTN - stable on labetalol 100mg tid  Hx of vaginal bleeding - previa - no longer previa      Plan:  US reviewed today by Dr. Schaeffer. Previa apparently has moved. The old collection of blood/clot also moving away from cervix. Can be delivered vaginally. Recommends keep in house until not red bleeding x 48 hours.

## 2025-03-13 NOTE — PROGRESS NOTES
0713 Bedside and Verbal shift change report given to Akila RN (oncoming nurse) by Brinda RN (offgoing nurse). Report included the following information Nurse Handoff Report, Adult Overview, Intake/Output, MAR, Recent Results, and Med Rec Status.     0930 Pt denies VB, LOF, Ctx/cramping at this time, states she feels fullness and constipation. Will request Colace from MD. Placed pt on EFM, abd palpated soft. Reported scant brown mucus, MD beavers aware.     1003 Reactive NST obtained reviewed by Dai White RN. Pt taken off EFM.     1127 Pt with c/o intermittent mild cramps, placed pt back on toco to monitor.     1448 Pt stated cramping is improved and not worse, requesting to be off toco, taken off. Pt resting comfortably in bed with no changes.     1800 Spoke with Dr. Beavers regarding plan of care per [facility] Schaeffer to continue to stay for observation, related information to pt and she verbalized understanding and agreeable.     1902 Bedside and Verbal shift change report given to Matilda LAO (oncoming nurse) by Akila RN (offgoing nurse). Report included the following information Nurse Handoff Report, Adult Overview, Intake/Output, MAR, Recent Results, and Med Rec Status.

## 2025-03-14 VITALS
RESPIRATION RATE: 17 BRPM | SYSTOLIC BLOOD PRESSURE: 130 MMHG | HEART RATE: 84 BPM | OXYGEN SATURATION: 98 % | DIASTOLIC BLOOD PRESSURE: 80 MMHG | TEMPERATURE: 98.6 F

## 2025-03-14 PROCEDURE — 94761 N-INVAS EAR/PLS OXIMETRY MLT: CPT

## 2025-03-14 PROCEDURE — G0378 HOSPITAL OBSERVATION PER HR: HCPCS

## 2025-03-14 PROCEDURE — 6370000000 HC RX 637 (ALT 250 FOR IP): Performed by: OBSTETRICS & GYNECOLOGY

## 2025-03-14 PROCEDURE — 59025 FETAL NON-STRESS TEST: CPT

## 2025-03-14 RX ORDER — LABETALOL 100 MG/1
100 TABLET, FILM COATED ORAL 3 TIMES DAILY
Qty: 180 TABLET | Refills: 5 | Status: SHIPPED | OUTPATIENT
Start: 2025-03-14

## 2025-03-14 RX ADMIN — LABETALOL HYDROCHLORIDE 100 MG: 100 TABLET, FILM COATED ORAL at 05:22

## 2025-03-14 RX ADMIN — LABETALOL HYDROCHLORIDE 100 MG: 100 TABLET, FILM COATED ORAL at 14:20

## 2025-03-14 NOTE — PROGRESS NOTES
1900 Report received from TASHIA Wilburn at the bedside. Assessment completed. Pt denies pain at this time. Denies VB. Positive fetal movement. Will continue to monitor.    2350 Pt called RN to room following shower with c/o passing a clot. Showed clot to RN. Clot dark brown and mucousy. Pt denies any pain and reports positive fetal movement at this time. Will continue to closely monitor.    0705 Report given to TASHIA Salvador at the bedside.

## 2025-03-14 NOTE — PROGRESS NOTES
Ante Partum Progress Note    Loni Jain  30w1d        Patient states she has no new complaints passed a brown blob of mucous yesterday. Baby moving. No BRB    Vitals:  Vitals:  Patient Vitals for the past 24 hrs:   BP Temp Temp src Pulse Resp SpO2   25 0759 110/62 98.4 °F (36.9 °C) Oral 81 16 99 %   25 0519 117/67 98.3 °F (36.8 °C) -- 77 16 99 %   25 2216 131/74 -- -- 79 -- --   25 -- -- -- 77 -- 98 %   25 -- -- -- 81 -- 99 %   25 -- -- -- 77 -- 98 %   25 -- -- -- 79 -- 98 %   25 -- -- -- 82 -- 98 %   25 -- -- -- 84 -- 99 %   25 -- -- -- 81 -- 98 %   25 -- -- -- 82 -- 98 %   25 (!) 165/88 -- -- 78 -- --   25 1916 133/73 98.1 °F (36.7 °C) Oral 78 18 99 %   25 1451 126/76 -- -- 79 15 98 %   25 1127 130/79 98.7 °F (37.1 °C) Oral 76 15 96 %   25 0915 136/81 99.2 °F (37.3 °C) Oral 84 15 96 %     Temp (24hrs), Av.5 °F (36.9 °C), Min:98.1 °F (36.7 °C), Max:99.2 °F (37.3 °C)      Last 24hr Input/Output:  No intake or output data in the 24 hours ending 25 0859     Non stress test:  Reactive    Uterine Activity: None     Exam:  Patient without distress.     Abdomen, fundus soft non-tender     Extremities, no redness or tenderness                   Labs:     Lab Results   Component Value Date/Time    WBC 10.4 2025 05:53 AM    WBC 12.3 2025 06:00 PM    WBC 11.1 2025 12:00 AM    WBC 11.4 2024 08:38 PM    WBC 10.1 2024 03:16 PM    WBC 9.3 2024 09:46 AM    HGB 11.6 2025 05:53 AM    HGB 12.2 2025 06:00 PM    HGB 12.0 2025 12:00 AM    HGB 12.1 2024 08:38 PM    HGB 12.6 2024 03:16 PM    HGB 13.8 2024 09:46 AM    HCT 35.2 2025 05:53 AM    HCT 36.3 2025 06:00 PM    HCT 36.7 2025 12:00 AM    HCT 35.4 2024 08:38 PM    HCT 37.9 2024 03:16 PM    HCT 40.3 2024 09:46 AM

## 2025-03-14 NOTE — PROGRESS NOTES
3/14/2025        RE: Loni Lazcanoquez         [address] [place] [address]          To Whom It May Concern,      Due to medical reasons, Loni Jain needs to be excused from work from the following dates 3/12/25-3/14/25.       Sincerely,          Madeleine Salas RN

## 2025-03-14 NOTE — PROGRESS NOTES
0700: Bedside and Verbal shift change report given to DARLENE Salas RN (oncoming nurse) by SHAUN Osborne RN (offgoing nurse). Report included the following information Nurse Handoff Report, Index, Adult Overview, Intake/Output, MAR, Recent Results, and Event Log.     0759: Pt resting in bed. Pt denies any cramping or bleeding this morning. Pt educated to let RN know if she experiences any kind of bleeding or cramping. Pt told that since she just had an NST at 0500 that this RN will do another NST in a few hours after she's had something to eat and moved around a little bit.    0911: MD states she wants patient to have Q4 Bps until this afternoon d/t patient having an elevated blood pressure documented last night without a repeat until 30 minutes later. Pt states this morning and last night she felt fine but MD just wants to make sure patient stays stable throughout the day before discharge.     0922: Pt placed on EFM for NST.    0951: Reactive NST noted. Pt removed from EFM.    1650: TORB from MD Wilkerson that patient can be discharged at this time.     1700: Discharge paperwork provided to patient at this time. Vital signs stable. Patient in no apparent distress at this time. Discharge paperwork in hand. Pt denies any questions or concerns at this time. Pt educated on how to change how she takes her labetalol and when the next dose would be and that she needs to call MD Wilkerson office on Monday to schedule a 1 week follow up appt.

## 2025-03-18 ENCOUNTER — ROUTINE PRENATAL (OUTPATIENT)
Age: 25
End: 2025-03-18

## 2025-03-18 VITALS — WEIGHT: 228 LBS | SYSTOLIC BLOOD PRESSURE: 121 MMHG | DIASTOLIC BLOOD PRESSURE: 78 MMHG | BODY MASS INDEX: 37.94 KG/M2

## 2025-03-18 DIAGNOSIS — O09.90 SUPERVISION OF HIGH RISK PREGNANCY, ANTEPARTUM: Primary | ICD-10-CM

## 2025-03-18 DIAGNOSIS — Z3A.30 30 WEEKS GESTATION OF PREGNANCY: ICD-10-CM

## 2025-03-18 DIAGNOSIS — O16.9 HYPERTENSION AFFECTING PREGNANCY, ANTEPARTUM: ICD-10-CM

## 2025-03-18 PROCEDURE — 0502F SUBSEQUENT PRENATAL CARE: CPT | Performed by: OBSTETRICS & GYNECOLOGY

## 2025-03-18 NOTE — PROGRESS NOTES
Patient Active Problem List    Diagnosis Date Noted    Hypertension affecting pregnancy, antepartum 2025    Supervision of high risk pregnancy, antepartum 10/29/2024     Overview Note:     EDC 2025 by US  NIPTS normal female  Horizon  CHTN - labetalol 100mg bid  PCR 0.1  Baby ASA  Varicella immune  Flu vaccine at work  Sister 32 weeks baby  anomalies  Obesity  MFM  Varicella immune  Previa - second trimester bleeding  Elevated AFP - repeat normal  Previa - CS   Abnormal glucola - 3hr GTT normal

## 2025-03-31 ENCOUNTER — ROUTINE PRENATAL (OUTPATIENT)
Age: 25
End: 2025-03-31
Payer: COMMERCIAL

## 2025-03-31 ENCOUNTER — ROUTINE PRENATAL (OUTPATIENT)
Age: 25
End: 2025-03-31

## 2025-03-31 VITALS — WEIGHT: 228 LBS | SYSTOLIC BLOOD PRESSURE: 122 MMHG | BODY MASS INDEX: 37.94 KG/M2 | DIASTOLIC BLOOD PRESSURE: 82 MMHG

## 2025-03-31 VITALS — HEART RATE: 107 BPM | DIASTOLIC BLOOD PRESSURE: 81 MMHG | SYSTOLIC BLOOD PRESSURE: 126 MMHG

## 2025-03-31 DIAGNOSIS — Z34.90 PREGNANCY, UNSPECIFIED GESTATIONAL AGE: ICD-10-CM

## 2025-03-31 DIAGNOSIS — Z3A.32 32 WEEKS GESTATION OF PREGNANCY: ICD-10-CM

## 2025-03-31 DIAGNOSIS — O16.9 HYPERTENSION AFFECTING PREGNANCY, ANTEPARTUM: ICD-10-CM

## 2025-03-31 DIAGNOSIS — E66.9 OBESITY (BMI 30-39.9): Primary | ICD-10-CM

## 2025-03-31 DIAGNOSIS — O10.919 CHRONIC HYPERTENSION AFFECTING PREGNANCY: ICD-10-CM

## 2025-03-31 DIAGNOSIS — O09.90 SUPERVISION OF HIGH RISK PREGNANCY, ANTEPARTUM: Primary | ICD-10-CM

## 2025-03-31 PROCEDURE — 76816 OB US FOLLOW-UP PER FETUS: CPT | Performed by: STUDENT IN AN ORGANIZED HEALTH CARE EDUCATION/TRAINING PROGRAM

## 2025-03-31 PROCEDURE — 99214 OFFICE O/P EST MOD 30 MIN: CPT | Performed by: STUDENT IN AN ORGANIZED HEALTH CARE EDUCATION/TRAINING PROGRAM

## 2025-03-31 PROCEDURE — 0502F SUBSEQUENT PRENATAL CARE: CPT | Performed by: OBSTETRICS & GYNECOLOGY

## 2025-03-31 PROCEDURE — 76819 FETAL BIOPHYS PROFIL W/O NST: CPT | Performed by: STUDENT IN AN ORGANIZED HEALTH CARE EDUCATION/TRAINING PROGRAM

## 2025-03-31 NOTE — PROCEDURES
PATIENT: NAVIN SHOOK   -  : 2000   -  DOS:2025   -  INTERPRETING PROVIDER:Luma Schaeffer,   Indication  ========    Placenta previa, bleeding in pregnancy, Obesity in pregnancy, Hypertension    Method  ======    Transvaginal ultrasound examination. Transabdominal ultrasound examination. View: suboptimal due to maternal acoustic properties. suboptimal due to unfavorable fetal  position. suboptimal due to advanced gestational age    Pregnancy  =========    Quiroga pregnancy. Number of fetuses: 1    Dating  ======    LMP on: 2024  GA by LMP 35 w + 1 d  HOWIE by LMP: 2025  Previous Ultrasound on: 2024  Type of prior assessment: GA  GA at prior assessment date 6 w + 0 d  GA by previous U/S 32 w + 4 d  HOWIE by previous Ultrasound: 2025  Ultrasound examination on: 3/31/2025  GA by U/S based upon: AC, BPD, Femur, HC  GA by U/S 30 w + 3 d  HOWIE by U/S: 2025  Assigned: based on ultrasound (GA), selected on 2025  Assigned GA 32 w + 4 d  Assigned HOWIE: 2025    Fetal Biometry  ============    Standard  BPD 68.6 mm 27w 4d <1% Hadlock  .0 mm 34w 0d 80% Ti  .9 mm 30w 2d <1% Hadlock  .6 mm 32w 4d 50% Hadlock  Femur 59.5 mm 31w 0d 7% Hadlock  EFW 1,765 g 31w 0d 13% Hadlock  EFW (lb) 3 lb  EFW (oz) 14 oz  EFW by: Hadlock (BPD-HC-AC-FL)  Other Structures   bpm    General Evaluation  ==============    Cardiac activity present.  bpm. Fetal movements: visualized. Presentation: Cephalic  Placenta: Placental site: anterior, appropriate distance from the internal os.  Umbilical cord: Cord vessels: 3 vessel cord  Amniotic fluid: Amount of AF: normal. MVP 3.7 cm. TERESA 7.8 cm. Q1 3.1 cm, Q2 0.9 cm, Q3 0.0 cm, Q4 3.7 cm    Fetal Anatomy  ===========    Stomach: normal  Kidneys: normal  Bladder: normal  Wants to know fetal sex: yes    Maternal Structures  ===============    Uterus / Cervix  Cervix: Visualized  Approach: Transvaginal  Cervical length 3.21

## 2025-03-31 NOTE — PROGRESS NOTES
Patient was seen 3/31/2025      Please look under media to view full consult and ultrasound report in ViewPoint.         Luma Schaeffer MD   Maternal Fetal Medicine

## 2025-03-31 NOTE — PROGRESS NOTES
Yellowish spotting  Seen by Maksim today - no previa. Deliver 37-38 due to HTN on meds  Baby moving  Cook 5/4 IOL 5/5

## 2025-04-01 NOTE — PROGRESS NOTES
Patient Active Problem List    Diagnosis Date Noted    Hypertension affecting pregnancy, antepartum 2025    Supervision of high risk pregnancy, antepartum 10/29/2024     Overview Note:     EDC 2025 by US  NIPTS normal female  Horizon  CHTN - labetalol 100mg bid  PCR 0.1  Baby ASA  Varicella immune  Flu vaccine at work  Sister 32 weeks baby  anomalies  Obesity  MFM  Varicella immune  Previa -resolved  Hx of second trimester bleeding  Elevated AFP - repeat normal  Abnormal glucola - 3hr GTT normal

## 2025-04-09 ENCOUNTER — ROUTINE PRENATAL (OUTPATIENT)
Age: 25
End: 2025-04-09

## 2025-04-09 VITALS — BODY MASS INDEX: 38.77 KG/M2 | DIASTOLIC BLOOD PRESSURE: 85 MMHG | WEIGHT: 233 LBS | SYSTOLIC BLOOD PRESSURE: 127 MMHG

## 2025-04-09 VITALS — SYSTOLIC BLOOD PRESSURE: 135 MMHG | DIASTOLIC BLOOD PRESSURE: 83 MMHG | HEART RATE: 82 BPM

## 2025-04-09 DIAGNOSIS — O09.90 SUPERVISION OF HIGH RISK PREGNANCY, ANTEPARTUM: Primary | ICD-10-CM

## 2025-04-09 DIAGNOSIS — E66.9 OBESITY (BMI 30-39.9): Primary | ICD-10-CM

## 2025-04-09 DIAGNOSIS — O10.919 CHRONIC HYPERTENSION AFFECTING PREGNANCY: ICD-10-CM

## 2025-04-09 DIAGNOSIS — O10.919 CHRONIC HYPERTENSION AFFECTING PREGNANCY: Primary | ICD-10-CM

## 2025-04-09 DIAGNOSIS — E66.9 OBESITY (BMI 30-39.9): ICD-10-CM

## 2025-04-09 DIAGNOSIS — O16.9 HYPERTENSION AFFECTING PREGNANCY, ANTEPARTUM: ICD-10-CM

## 2025-04-09 DIAGNOSIS — Z34.90 PREGNANCY, UNSPECIFIED GESTATIONAL AGE: ICD-10-CM

## 2025-04-09 DIAGNOSIS — Z3A.33 33 WEEKS GESTATION OF PREGNANCY: ICD-10-CM

## 2025-04-09 PROCEDURE — 0502F SUBSEQUENT PRENATAL CARE: CPT | Performed by: OBSTETRICS & GYNECOLOGY

## 2025-04-09 NOTE — PROGRESS NOTES
surveillance for cHTN .   Serial growth scans q4 weeks   Rec delivery between 37.0-38.6 WGA.     2025 0918 I have reviewed the ultrasound images from today. I have reviewed and approved the NP care/treatment plan.  Victor M Taylor MD  Maternal/Fetal Medicine     Please see Viewpoint for ultrasound findings.     Subjective   Loni Jain (:  2000) is a 24 y.o. female,Established patient, here for evaluation of the following chief complaint(s):  1. Chronic hypertension affecting pregnancy  2. Obesity (BMI 30-39.9)    Objective   Physical Exam  Vitals reviewed.   Constitutional:       Appearance: Normal appearance.   Neurological:      Mental Status: She is alert.   Psychiatric:         Mood and Affect: Mood normal.         Judgment: Judgment normal.       On this date 2025 I have spent time reviewing previous notes, test results and discussing the diagnosis and importance of compliance with the treatment plan face to face with the patient as well as documenting on the day of the visit.  An electronic signature was used to authenticate this note.    --JERRY Hart - CNP

## 2025-04-09 NOTE — PROCEDURES
discharge.  - Patient states no brownish discharge or bright red bleeding, no cramping today.    2025 0928 I have reviewed the ultrasound images from today. I have reviewed and approved the NP care/treatment plan.  Victor M Taylor MD  Maternal/Fetal Medicine    Recommendations  ==============    Continue weekly visits for  surveillance for cHTN .  Serial growth scans q4 weeks  Rec delivery between 37.0-38.6 WGA.    Coding  ======    Code: O99.213  Description: Obesity complicating pregnancy  Code: O44.03  Description: Complete placenta previa NOS or without hemorrhage  Code: Z3A.33  Description: Weeks of gestation  Code: 18227  Description: Fetal biophysical profile; without non-stress testing

## 2025-04-14 ENCOUNTER — HOSPITAL ENCOUNTER (INPATIENT)
Facility: HOSPITAL | Age: 25
LOS: 4 days | Discharge: HOME | End: 2025-04-20
Attending: OBSTETRICS & GYNECOLOGY | Admitting: OBSTETRICS & GYNECOLOGY
Payer: COMMERCIAL

## 2025-04-14 PROBLEM — O16.9 HYPERTENSION AFFECTING PREGNANCY, ANTEPARTUM, UNSPECIFIED TRIMESTER: Status: ACTIVE | Noted: 2025-04-14

## 2025-04-14 LAB
ALBUMIN SERPL-MCNC: 2.8 G/DL (ref 3.5–5)
ALBUMIN/GLOB SERPL: 0.8 (ref 1.1–2.2)
ALP SERPL-CCNC: 98 U/L (ref 45–117)
ALT SERPL-CCNC: 19 U/L (ref 12–78)
ANION GAP SERPL CALC-SCNC: 7 MMOL/L (ref 2–12)
AST SERPL-CCNC: 18 U/L (ref 15–37)
BASOPHILS # BLD: 0.04 K/UL (ref 0–0.1)
BASOPHILS NFR BLD: 0.4 % (ref 0–1)
BILIRUB SERPL-MCNC: 0.4 MG/DL (ref 0.2–1)
BUN SERPL-MCNC: 10 MG/DL (ref 6–20)
BUN/CREAT SERPL: 15 (ref 12–20)
CALCIUM SERPL-MCNC: 9.3 MG/DL (ref 8.5–10.1)
CHLORIDE SERPL-SCNC: 107 MMOL/L (ref 97–108)
CO2 SERPL-SCNC: 23 MMOL/L (ref 21–32)
CREAT SERPL-MCNC: 0.68 MG/DL (ref 0.55–1.02)
CREAT UR-MCNC: 79 MG/DL
DIFFERENTIAL METHOD BLD: ABNORMAL
EOSINOPHIL # BLD: 0.1 K/UL (ref 0–0.4)
EOSINOPHIL NFR BLD: 1 % (ref 0–7)
ERYTHROCYTE [DISTWIDTH] IN BLOOD BY AUTOMATED COUNT: 13.2 % (ref 11.5–14.5)
GLOBULIN SER CALC-MCNC: 3.5 G/DL (ref 2–4)
GLUCOSE SERPL-MCNC: 84 MG/DL (ref 65–100)
HCT VFR BLD AUTO: 34.4 % (ref 35–47)
HGB BLD-MCNC: 11.7 G/DL (ref 11.5–16)
IMM GRANULOCYTES # BLD AUTO: 0.08 K/UL (ref 0–0.04)
IMM GRANULOCYTES NFR BLD AUTO: 0.8 % (ref 0–0.5)
LDH SERPL L TO P-CCNC: 154 U/L (ref 81–246)
LYMPHOCYTES # BLD: 1.83 K/UL (ref 0.8–3.5)
LYMPHOCYTES NFR BLD: 18.2 % (ref 12–49)
MCH RBC QN AUTO: 29.5 PG (ref 26–34)
MCHC RBC AUTO-ENTMCNC: 34 G/DL (ref 30–36.5)
MCV RBC AUTO: 86.9 FL (ref 80–99)
MONOCYTES # BLD: 0.64 K/UL (ref 0–1)
MONOCYTES NFR BLD: 6.4 % (ref 5–13)
NEUTS SEG # BLD: 7.36 K/UL (ref 1.8–8)
NEUTS SEG NFR BLD: 73.2 % (ref 32–75)
NRBC # BLD: 0 K/UL (ref 0–0.01)
NRBC BLD-RTO: 0 PER 100 WBC
PLATELET # BLD AUTO: 258 K/UL (ref 150–400)
PMV BLD AUTO: 10.1 FL (ref 8.9–12.9)
POTASSIUM SERPL-SCNC: 4.2 MMOL/L (ref 3.5–5.1)
PROT SERPL-MCNC: 6.3 G/DL (ref 6.4–8.2)
PROT UR-MCNC: 15 MG/DL (ref 0–11.9)
PROT/CREAT UR-RTO: 0.2
RBC # BLD AUTO: 3.96 M/UL (ref 3.8–5.2)
SODIUM SERPL-SCNC: 137 MMOL/L (ref 136–145)
WBC # BLD AUTO: 10.1 K/UL (ref 3.6–11)

## 2025-04-14 PROCEDURE — 82570 ASSAY OF URINE CREATININE: CPT

## 2025-04-14 PROCEDURE — 7210000100 HC LABOR FEE PER 1 HR: Performed by: OBSTETRICS & GYNECOLOGY

## 2025-04-14 PROCEDURE — 59200 INSERT CERVICAL DILATOR: CPT | Performed by: OBSTETRICS & GYNECOLOGY

## 2025-04-14 PROCEDURE — 85025 COMPLETE CBC W/AUTO DIFF WBC: CPT

## 2025-04-14 PROCEDURE — 6370000000 HC RX 637 (ALT 250 FOR IP): Performed by: OBSTETRICS & GYNECOLOGY

## 2025-04-14 PROCEDURE — 36415 COLL VENOUS BLD VENIPUNCTURE: CPT

## 2025-04-14 PROCEDURE — G0378 HOSPITAL OBSERVATION PER HR: HCPCS

## 2025-04-14 PROCEDURE — 80053 COMPREHEN METABOLIC PANEL: CPT

## 2025-04-14 PROCEDURE — 99222 1ST HOSP IP/OBS MODERATE 55: CPT | Performed by: OBSTETRICS & GYNECOLOGY

## 2025-04-14 PROCEDURE — 84156 ASSAY OF PROTEIN URINE: CPT

## 2025-04-14 PROCEDURE — 83615 LACTATE (LD) (LDH) ENZYME: CPT

## 2025-04-14 RX ORDER — SODIUM CHLORIDE, SODIUM LACTATE, POTASSIUM CHLORIDE, CALCIUM CHLORIDE 600; 310; 30; 20 MG/100ML; MG/100ML; MG/100ML; MG/100ML
INJECTION, SOLUTION INTRAVENOUS CONTINUOUS
Status: DISCONTINUED | OUTPATIENT
Start: 2025-04-14 | End: 2025-04-17

## 2025-04-14 RX ORDER — LABETALOL 200 MG/1
200 TABLET, FILM COATED ORAL EVERY 12 HOURS SCHEDULED
Status: DISCONTINUED | OUTPATIENT
Start: 2025-04-14 | End: 2025-04-15

## 2025-04-14 RX ORDER — ONDANSETRON 2 MG/ML
4 INJECTION INTRAMUSCULAR; INTRAVENOUS EVERY 6 HOURS PRN
Status: DISCONTINUED | OUTPATIENT
Start: 2025-04-14 | End: 2025-04-17

## 2025-04-14 RX ORDER — ONDANSETRON 4 MG/1
4 TABLET, ORALLY DISINTEGRATING ORAL EVERY 8 HOURS PRN
Status: DISCONTINUED | OUTPATIENT
Start: 2025-04-14 | End: 2025-04-17

## 2025-04-14 RX ORDER — ACETAMINOPHEN 500 MG
1000 TABLET ORAL EVERY 8 HOURS SCHEDULED
Status: DISCONTINUED | OUTPATIENT
Start: 2025-04-14 | End: 2025-04-20

## 2025-04-14 RX ADMIN — LABETALOL HYDROCHLORIDE 200 MG: 200 TABLET, FILM COATED ORAL at 20:57

## 2025-04-14 NOTE — CONSULTS
MATERNAL FETAL MEDICINE  INPATIENT CONSULTATION    REQUESTED BY: Eveline Wilkerson MD   DATE OF CONSULT: 25    REASON FOR CONSULTATION: CHTN,  rule out superimposed pre-eclampsia    Loni Jain is a 24 y.o. female  at 34w4d.  The patient has a known history of chronic hypertension for which she takes labetalol 100 mg 3 times daily.  Her dose had been previously increased from twice daily dosing.  The patient was doing well until yesterday when she noticed that her blood pressures were in the 140s over 90s at home yesterday on multiple evaluations.  She noted that her blood pressure suddenly was in the upper 130s all day long with a few sporadic 140s.  She noted that her blood pressure this morning was 140/90 during observation today the patient's maximal blood pressure has been 142/94.  She does have a blood pressure of 140/91 and several of their 137/91 and 139/90 she has normal LFTs, platelets are 258,000 and a PC ratio 0.2  She does note that she did have a frontal headache today but its intermittent nature and she does not currently report this.  She otherwise denies headache visual changes right upper quadrant or epigastric pain    HPI      Patient Active Problem List   Diagnosis    Supervision of high risk pregnancy, antepartum    Hypertension affecting pregnancy, antepartum       Past Medical History:   Diagnosis Date    Papanicolaou smear for cervical cancer screening 10/29/2024    Normal       Past Surgical History:   Procedure Laterality Date    WISDOM TOOTH EXTRACTION         OB History    Para Term  AB Living   2    1    SAB IAB Ectopic Molar Multiple Live Births   1           # Outcome Date GA Lbr Pollo/2nd Weight Sex Type Anes PTL Lv   2 Current            1 SAB 2023 6w0d              Allergies   Allergen Reactions    Citrus Hives    Molds & Smuts        No current facility-administered medications for this encounter.    Social History     Tobacco Use    Smoking

## 2025-04-14 NOTE — H&P
History & Physical    Name: Loni Jain MRN: [id number]  SSN: [id number]    YOB: 2000  Age: 24 y.o.  Sex: female        Subjective:     Estimated Date of Delivery: 25  OB History          2    Para        Term                AB   1    Living             SAB   1    IAB        Ectopic        Molar        Multiple        Live Births                    Ms. Jain is admitted with pregnancy at 34w4d for  elevated BP at home . Prenatal course was complicated by chronic hypertension.She has been well controlled on Labetalol 100mg tid.  Noted more elevated BP at home over weekend 140/90's. Baby moving. No S/S PIH. Please see prenatal records for details. BP mostly 130/80's - few 140/90    Patient Active Problem List    Diagnosis Date Noted    Hypertension affecting pregnancy, antepartum 2025    Supervision of high risk pregnancy, antepartum 10/29/2024     EDC 2025 by US  NIPTS normal female  [facility]  CHTN - labetalol 100mg tid  PCR 0.1  Baby ASA  Varicella immune  Flu vaccine at work  Sister 32 weeks baby  anomalies  Obesity  MFM  Varicella immune  Previa -resolved  Hx of second trimester bleeding  Elevated AFP - repeat normal  Abnormal glucola - 3hr GTT normal           Past Medical History:   Diagnosis Date    Papanicolaou smear for cervical cancer screening 10/29/2024    Normal     Past Surgical History:   Procedure Laterality Date    WISDOM TOOTH EXTRACTION       Social History     Occupational History    Not on file   Tobacco Use    Smoking status: Never    Smokeless tobacco: Never   Vaping Use    Vaping status: Never Used   Substance and Sexual Activity    Alcohol use: Not Currently    Drug use: Never    Sexual activity: Yes     Partners: Male     Family History   Problem Relation Age of Onset    Migraines Mother     Diabetes Paternal Grandmother        Allergies   Allergen Reactions    Citrus Hives    Molds & Smuts      Prior to Admission medications

## 2025-04-14 NOTE — PROCEDURES
PATIENT: NAVIN SHOOK   -  : 2000   -  DOS:2025   -  INTERPRETING PROVIDER:Victor M Taylor,   Indication  ========    Placenta previa-RESOLVED, bleeding in pregnancy, Obesity in pregnancy, Hypertension    Method  ======    Transabdominal ultrasound examination. View: Sufficient    Pregnancy  =========    [place] pregnancy. Number of fetuses: 1    Dating  ======    LMP on: 2024  GA by LMP 37 w + 1 d  HOWIE by LMP: 2025  Previous Ultrasound on: 2024  Type of prior assessment: GA  GA at prior assessment date 6 w + 0 d  GA by previous U/S 34 w + 4 d  HOWIE by previous Ultrasound: 2025  Assigned: based on ultrasound (GA), selected on 2025  Assigned GA 34 w + 4 d  Assigned HOWIE: 2025    General Evaluation  ==============    Cardiac activity present.  bpm. Fetal movements: visualized. Presentation: Cephalic  Placenta: Placental site: anterior, appropriate distance from the internal os  Umbilical cord: Cord vessels: 3 vessel cord    Fetal Anatomy  ===========    Stomach: normal  Kidneys: normal  Bladder: normal  Wants to know fetal sex: yes    Amniotic Fluid Assessment  =====================    Amount of AF: normal  MVP 5.7 cm. TERESA 18.4 cm. Q1 5.6 cm, Q2 5.7 cm, Q3 4.7 cm, Q4 2.5 cm    Biophysical Profile  ==============    2: Fetal breathing movements  2: Gross body movements  2: Fetal tone  2: Amniotic fluid volume  8/8 Biophysical profile score    Findings  =======    Intrauterine [place]ga pregnancy at 34w 4d by clinical dates.  Amniotic fluid: normal.  Placenta is anterior, appropriate distance from the internal os.  Cephalic presentation.  Biophysical profile score is 8/8.    The ultrasound findings as listed above and diagnostic limitations of ultrasound imaging, including inability to exclude all anomalies, have been reviewed with the patient. All  questions and concerns addressed.    Consultation  ==========    Please see EPIC EMR for consult

## 2025-04-15 PROCEDURE — 6370000000 HC RX 637 (ALT 250 FOR IP): Performed by: OBSTETRICS & GYNECOLOGY

## 2025-04-15 PROCEDURE — G0378 HOSPITAL OBSERVATION PER HR: HCPCS

## 2025-04-15 PROCEDURE — 99214 OFFICE O/P EST MOD 30 MIN: CPT | Performed by: OBSTETRICS & GYNECOLOGY

## 2025-04-15 PROCEDURE — 94761 N-INVAS EAR/PLS OXIMETRY MLT: CPT

## 2025-04-15 RX ORDER — LABETALOL 100 MG/1
100 TABLET, FILM COATED ORAL
Status: COMPLETED | OUTPATIENT
Start: 2025-04-15 | End: 2025-04-15

## 2025-04-15 RX ADMIN — LABETALOL HYDROCHLORIDE 300 MG: 200 TABLET, FILM COATED ORAL at 21:03

## 2025-04-15 RX ADMIN — ACETAMINOPHEN 1000 MG: 500 TABLET ORAL at 17:00

## 2025-04-15 RX ADMIN — LABETALOL HYDROCHLORIDE 100 MG: 100 TABLET, FILM COATED ORAL at 12:54

## 2025-04-15 RX ADMIN — LABETALOL HYDROCHLORIDE 200 MG: 200 TABLET, FILM COATED ORAL at 08:25

## 2025-04-15 ASSESSMENT — PAIN SCALES - GENERAL: PAINLEVEL_OUTOF10: 5

## 2025-04-15 ASSESSMENT — PAIN DESCRIPTION - DESCRIPTORS: DESCRIPTORS: ACHING

## 2025-04-15 ASSESSMENT — PAIN DESCRIPTION - LOCATION: LOCATION: HEAD

## 2025-04-15 ASSESSMENT — PAIN DESCRIPTION - ORIENTATION: ORIENTATION: ANTERIOR

## 2025-04-15 ASSESSMENT — PAIN - FUNCTIONAL ASSESSMENT: PAIN_FUNCTIONAL_ASSESSMENT: PREVENTS OR INTERFERES SOME ACTIVE ACTIVITIES AND ADLS

## 2025-04-16 PROBLEM — O16.3 HYPERTENSION COMPLICATING PREGNANCY, THIRD TRIMESTER: Status: ACTIVE | Noted: 2025-04-16

## 2025-04-16 LAB
ALBUMIN SERPL-MCNC: 2.6 G/DL (ref 3.5–5)
ALBUMIN/GLOB SERPL: 0.8 (ref 1.1–2.2)
ALP SERPL-CCNC: 92 U/L (ref 45–117)
ALT SERPL-CCNC: 17 U/L (ref 12–78)
ANION GAP SERPL CALC-SCNC: 8 MMOL/L (ref 2–12)
AST SERPL-CCNC: 13 U/L (ref 15–37)
BASOPHILS # BLD: 0.04 K/UL (ref 0–0.1)
BASOPHILS NFR BLD: 0.5 % (ref 0–1)
BILIRUB SERPL-MCNC: 0.3 MG/DL (ref 0.2–1)
BUN SERPL-MCNC: 9 MG/DL (ref 6–20)
BUN/CREAT SERPL: 14 (ref 12–20)
CALCIUM SERPL-MCNC: 9 MG/DL (ref 8.5–10.1)
CHLORIDE SERPL-SCNC: 110 MMOL/L (ref 97–108)
CO2 SERPL-SCNC: 21 MMOL/L (ref 21–32)
CREAT SERPL-MCNC: 0.64 MG/DL (ref 0.55–1.02)
CREAT UR-MCNC: 68 MG/DL
DIFFERENTIAL METHOD BLD: ABNORMAL
EOSINOPHIL # BLD: 0.12 K/UL (ref 0–0.4)
EOSINOPHIL NFR BLD: 1.4 % (ref 0–7)
ERYTHROCYTE [DISTWIDTH] IN BLOOD BY AUTOMATED COUNT: 13.3 % (ref 11.5–14.5)
GLOBULIN SER CALC-MCNC: 3.2 G/DL (ref 2–4)
GLUCOSE SERPL-MCNC: 91 MG/DL (ref 65–100)
HCT VFR BLD AUTO: 33.9 % (ref 35–47)
HGB BLD-MCNC: 11.4 G/DL (ref 11.5–16)
IMM GRANULOCYTES # BLD AUTO: 0.09 K/UL (ref 0–0.04)
IMM GRANULOCYTES NFR BLD AUTO: 1 % (ref 0–0.5)
LYMPHOCYTES # BLD: 2.16 K/UL (ref 0.8–3.5)
LYMPHOCYTES NFR BLD: 24.8 % (ref 12–49)
MCH RBC QN AUTO: 29.5 PG (ref 26–34)
MCHC RBC AUTO-ENTMCNC: 33.6 G/DL (ref 30–36.5)
MCV RBC AUTO: 87.6 FL (ref 80–99)
MONOCYTES # BLD: 0.55 K/UL (ref 0–1)
MONOCYTES NFR BLD: 6.3 % (ref 5–13)
NEUTS SEG # BLD: 5.76 K/UL (ref 1.8–8)
NEUTS SEG NFR BLD: 66 % (ref 32–75)
NRBC # BLD: 0 K/UL (ref 0–0.01)
NRBC BLD-RTO: 0 PER 100 WBC
PLATELET # BLD AUTO: 229 K/UL (ref 150–400)
PMV BLD AUTO: 10.1 FL (ref 8.9–12.9)
POTASSIUM SERPL-SCNC: 4 MMOL/L (ref 3.5–5.1)
PROT SERPL-MCNC: 5.8 G/DL (ref 6.4–8.2)
PROT UR-MCNC: 13 MG/DL (ref 0–11.9)
PROT/CREAT UR-RTO: 0.2
RBC # BLD AUTO: 3.87 M/UL (ref 3.8–5.2)
SODIUM SERPL-SCNC: 139 MMOL/L (ref 136–145)
WBC # BLD AUTO: 8.7 K/UL (ref 3.6–11)

## 2025-04-16 PROCEDURE — 6370000000 HC RX 637 (ALT 250 FOR IP): Performed by: OBSTETRICS & GYNECOLOGY

## 2025-04-16 PROCEDURE — 0U7C7DJ DILATION OF CERVIX WITH INTRALUM DEV, TEMP, VIA OPENING: ICD-10-PCS | Performed by: OBSTETRICS & GYNECOLOGY

## 2025-04-16 PROCEDURE — NBSRV NON-BILLABLE SERVICE: Performed by: OBSTETRICS & GYNECOLOGY

## 2025-04-16 PROCEDURE — 85025 COMPLETE CBC W/AUTO DIFF WBC: CPT

## 2025-04-16 PROCEDURE — 80053 COMPREHEN METABOLIC PANEL: CPT

## 2025-04-16 PROCEDURE — 4A1HXCZ MONITORING OF PRODUCTS OF CONCEPTION, CARDIAC RATE, EXTERNAL APPROACH: ICD-10-PCS | Performed by: OBSTETRICS & GYNECOLOGY

## 2025-04-16 PROCEDURE — 1100000000 HC RM PRIVATE

## 2025-04-16 PROCEDURE — 59025 FETAL NON-STRESS TEST: CPT

## 2025-04-16 PROCEDURE — 96372 THER/PROPH/DIAG INJ SC/IM: CPT

## 2025-04-16 PROCEDURE — G0378 HOSPITAL OBSERVATION PER HR: HCPCS

## 2025-04-16 PROCEDURE — 94761 N-INVAS EAR/PLS OXIMETRY MLT: CPT

## 2025-04-16 PROCEDURE — 6370000000 HC RX 637 (ALT 250 FOR IP)

## 2025-04-16 PROCEDURE — 84156 ASSAY OF PROTEIN URINE: CPT

## 2025-04-16 PROCEDURE — 82570 ASSAY OF URINE CREATININE: CPT

## 2025-04-16 PROCEDURE — 3E0DXGC INTRODUCTION OF OTHER THERAPEUTIC SUBSTANCE INTO MOUTH AND PHARYNX, EXTERNAL APPROACH: ICD-10-PCS | Performed by: OBSTETRICS & GYNECOLOGY

## 2025-04-16 PROCEDURE — 6360000002 HC RX W HCPCS: Performed by: OBSTETRICS & GYNECOLOGY

## 2025-04-16 PROCEDURE — 36415 COLL VENOUS BLD VENIPUNCTURE: CPT

## 2025-04-16 RX ORDER — NIFEDIPINE 10 MG/1
10 CAPSULE ORAL
Status: COMPLETED | OUTPATIENT
Start: 2025-04-16 | End: 2025-04-16

## 2025-04-16 RX ORDER — NIFEDIPINE 10 MG/1
CAPSULE ORAL
Status: COMPLETED
Start: 2025-04-16 | End: 2025-04-16

## 2025-04-16 RX ORDER — BETAMETHASONE SODIUM PHOSPHATE AND BETAMETHASONE ACETATE 3; 3 MG/ML; MG/ML
12 INJECTION, SUSPENSION INTRA-ARTICULAR; INTRALESIONAL; INTRAMUSCULAR; SOFT TISSUE EVERY 24 HOURS
Status: COMPLETED | OUTPATIENT
Start: 2025-04-16 | End: 2025-04-17

## 2025-04-16 RX ADMIN — NIFEDIPINE 10 MG: 10 CAPSULE ORAL at 13:19

## 2025-04-16 RX ADMIN — LABETALOL HYDROCHLORIDE 300 MG: 200 TABLET, FILM COATED ORAL at 21:22

## 2025-04-16 RX ADMIN — LABETALOL HYDROCHLORIDE 300 MG: 200 TABLET, FILM COATED ORAL at 08:50

## 2025-04-16 RX ADMIN — BETAMETHASONE ACETATE AND BETAMETHASONE SODIUM PHOSPHATE 12 MG: 3; 3 INJECTION, SUSPENSION INTRA-ARTICULAR; INTRALESIONAL; INTRAMUSCULAR; SOFT TISSUE at 16:51

## 2025-04-17 LAB
ABO + RH BLD: NORMAL
ALBUMIN SERPL-MCNC: 2.8 G/DL (ref 3.5–5)
ALBUMIN/GLOB SERPL: 0.8 (ref 1.1–2.2)
ALP SERPL-CCNC: 97 U/L (ref 45–117)
ALT SERPL-CCNC: 18 U/L (ref 12–78)
ANION GAP SERPL CALC-SCNC: 8 MMOL/L (ref 2–12)
AST SERPL-CCNC: 12 U/L (ref 15–37)
BASOPHILS # BLD: 0.01 K/UL (ref 0–0.1)
BASOPHILS NFR BLD: 0.1 % (ref 0–1)
BILIRUB SERPL-MCNC: 0.4 MG/DL (ref 0.2–1)
BLOOD GROUP ANTIBODIES SERPL: NORMAL
BUN SERPL-MCNC: 15 MG/DL (ref 6–20)
BUN/CREAT SERPL: 19 (ref 12–20)
CALCIUM SERPL-MCNC: 9.6 MG/DL (ref 8.5–10.1)
CHLORIDE SERPL-SCNC: 106 MMOL/L (ref 97–108)
CO2 SERPL-SCNC: 21 MMOL/L (ref 21–32)
CREAT SERPL-MCNC: 0.81 MG/DL (ref 0.55–1.02)
CREAT UR-MCNC: 74 MG/DL
DIFFERENTIAL METHOD BLD: ABNORMAL
EOSINOPHIL # BLD: 0 K/UL (ref 0–0.4)
EOSINOPHIL NFR BLD: 0 % (ref 0–7)
ERYTHROCYTE [DISTWIDTH] IN BLOOD BY AUTOMATED COUNT: 13.3 % (ref 11.5–14.5)
GLOBULIN SER CALC-MCNC: 3.4 G/DL (ref 2–4)
GLUCOSE SERPL-MCNC: 132 MG/DL (ref 65–100)
HCT VFR BLD AUTO: 33.8 % (ref 35–47)
HGB BLD-MCNC: 11.5 G/DL (ref 11.5–16)
IMM GRANULOCYTES # BLD AUTO: 0.15 K/UL (ref 0–0.04)
IMM GRANULOCYTES NFR BLD AUTO: 1.2 % (ref 0–0.5)
LYMPHOCYTES # BLD: 1.46 K/UL (ref 0.8–3.5)
LYMPHOCYTES NFR BLD: 11.3 % (ref 12–49)
MCH RBC QN AUTO: 29.2 PG (ref 26–34)
MCHC RBC AUTO-ENTMCNC: 34 G/DL (ref 30–36.5)
MCV RBC AUTO: 85.8 FL (ref 80–99)
MONOCYTES # BLD: 0.26 K/UL (ref 0–1)
MONOCYTES NFR BLD: 2 % (ref 5–13)
NEUTS SEG # BLD: 11.01 K/UL (ref 1.8–8)
NEUTS SEG NFR BLD: 85.4 % (ref 32–75)
NRBC # BLD: 0 K/UL (ref 0–0.01)
NRBC BLD-RTO: 0 PER 100 WBC
PLATELET # BLD AUTO: 240 K/UL (ref 150–400)
PMV BLD AUTO: 10.5 FL (ref 8.9–12.9)
POTASSIUM SERPL-SCNC: 4.6 MMOL/L (ref 3.5–5.1)
PROT SERPL-MCNC: 6.2 G/DL (ref 6.4–8.2)
PROT UR-MCNC: 13 MG/DL (ref 0–11.9)
PROT/CREAT UR-RTO: 0.2
RBC # BLD AUTO: 3.94 M/UL (ref 3.8–5.2)
SODIUM SERPL-SCNC: 135 MMOL/L (ref 136–145)
SPECIMEN EXP DATE BLD: NORMAL
WBC # BLD AUTO: 12.9 K/UL (ref 3.6–11)

## 2025-04-17 PROCEDURE — 85025 COMPLETE CBC W/AUTO DIFF WBC: CPT

## 2025-04-17 PROCEDURE — 6360000002 HC RX W HCPCS: Performed by: OBSTETRICS & GYNECOLOGY

## 2025-04-17 PROCEDURE — 6370000000 HC RX 637 (ALT 250 FOR IP): Performed by: OBSTETRICS & GYNECOLOGY

## 2025-04-17 PROCEDURE — 86780 TREPONEMA PALLIDUM: CPT

## 2025-04-17 PROCEDURE — NBSRV NON-BILLABLE SERVICE: Performed by: OBSTETRICS & GYNECOLOGY

## 2025-04-17 PROCEDURE — 86901 BLOOD TYPING SEROLOGIC RH(D): CPT

## 2025-04-17 PROCEDURE — 82570 ASSAY OF URINE CREATININE: CPT

## 2025-04-17 PROCEDURE — 36415 COLL VENOUS BLD VENIPUNCTURE: CPT

## 2025-04-17 PROCEDURE — 86900 BLOOD TYPING SEROLOGIC ABO: CPT

## 2025-04-17 PROCEDURE — 86850 RBC ANTIBODY SCREEN: CPT

## 2025-04-17 PROCEDURE — 94761 N-INVAS EAR/PLS OXIMETRY MLT: CPT

## 2025-04-17 PROCEDURE — 84156 ASSAY OF PROTEIN URINE: CPT

## 2025-04-17 PROCEDURE — 80053 COMPREHEN METABOLIC PANEL: CPT

## 2025-04-17 PROCEDURE — 1100000000 HC RM PRIVATE

## 2025-04-17 PROCEDURE — 2580000003 HC RX 258: Performed by: OBSTETRICS & GYNECOLOGY

## 2025-04-17 RX ORDER — SODIUM CHLORIDE 0.9 % (FLUSH) 0.9 %
5-40 SYRINGE (ML) INJECTION PRN
Status: DISCONTINUED | OUTPATIENT
Start: 2025-04-17 | End: 2025-04-20

## 2025-04-17 RX ORDER — PROCHLORPERAZINE EDISYLATE 5 MG/ML
10 INJECTION INTRAMUSCULAR; INTRAVENOUS EVERY 6 HOURS PRN
Status: DISCONTINUED | OUTPATIENT
Start: 2025-04-17 | End: 2025-04-20

## 2025-04-17 RX ORDER — SODIUM CHLORIDE 0.9 % (FLUSH) 0.9 %
5-40 SYRINGE (ML) INJECTION EVERY 12 HOURS SCHEDULED
Status: DISCONTINUED | OUTPATIENT
Start: 2025-04-17 | End: 2025-04-20

## 2025-04-17 RX ORDER — DOCUSATE SODIUM 100 MG/1
100 CAPSULE, LIQUID FILLED ORAL 2 TIMES DAILY
Status: DISCONTINUED | OUTPATIENT
Start: 2025-04-17 | End: 2025-04-20

## 2025-04-17 RX ORDER — DIPHENHYDRAMINE HYDROCHLORIDE 50 MG/ML
12.5 INJECTION, SOLUTION INTRAMUSCULAR; INTRAVENOUS EVERY 4 HOURS PRN
Status: DISCONTINUED | OUTPATIENT
Start: 2025-04-17 | End: 2025-04-20

## 2025-04-17 RX ORDER — ONDANSETRON 4 MG/1
4 TABLET, ORALLY DISINTEGRATING ORAL EVERY 8 HOURS PRN
Status: DISCONTINUED | OUTPATIENT
Start: 2025-04-17 | End: 2025-04-20

## 2025-04-17 RX ORDER — CARBOPROST TROMETHAMINE 250 UG/ML
250 INJECTION, SOLUTION INTRAMUSCULAR PRN
Status: DISCONTINUED | OUTPATIENT
Start: 2025-04-17 | End: 2025-04-20

## 2025-04-17 RX ORDER — SODIUM CHLORIDE, SODIUM LACTATE, POTASSIUM CHLORIDE, CALCIUM CHLORIDE 600; 310; 30; 20 MG/100ML; MG/100ML; MG/100ML; MG/100ML
INJECTION, SOLUTION INTRAVENOUS CONTINUOUS
Status: DISCONTINUED | OUTPATIENT
Start: 2025-04-17 | End: 2025-04-20

## 2025-04-17 RX ORDER — MISOPROSTOL 200 UG/1
400 TABLET ORAL PRN
Status: DISCONTINUED | OUTPATIENT
Start: 2025-04-17 | End: 2025-04-20

## 2025-04-17 RX ORDER — HYDROMORPHONE HYDROCHLORIDE 1 MG/ML
1 INJECTION, SOLUTION INTRAMUSCULAR; INTRAVENOUS; SUBCUTANEOUS ONCE
Status: COMPLETED | OUTPATIENT
Start: 2025-04-17 | End: 2025-04-17

## 2025-04-17 RX ORDER — SEVOFLURANE 250 ML/250ML
1 LIQUID RESPIRATORY (INHALATION) CONTINUOUS PRN
Status: DISCONTINUED | OUTPATIENT
Start: 2025-04-17 | End: 2025-04-20

## 2025-04-17 RX ORDER — FAMOTIDINE 20 MG/1
20 TABLET, FILM COATED ORAL 2 TIMES DAILY
Status: DISCONTINUED | OUTPATIENT
Start: 2025-04-17 | End: 2025-04-20

## 2025-04-17 RX ORDER — TERBUTALINE SULFATE 1 MG/ML
0.25 INJECTION SUBCUTANEOUS
Status: DISCONTINUED | OUTPATIENT
Start: 2025-04-17 | End: 2025-04-20

## 2025-04-17 RX ORDER — SODIUM CHLORIDE, SODIUM LACTATE, POTASSIUM CHLORIDE, AND CALCIUM CHLORIDE .6; .31; .03; .02 G/100ML; G/100ML; G/100ML; G/100ML
1000 INJECTION, SOLUTION INTRAVENOUS PRN
Status: DISCONTINUED | OUTPATIENT
Start: 2025-04-17 | End: 2025-04-20

## 2025-04-17 RX ORDER — SODIUM CHLORIDE 9 MG/ML
INJECTION, SOLUTION INTRAVENOUS PRN
Status: DISCONTINUED | OUTPATIENT
Start: 2025-04-17 | End: 2025-04-20

## 2025-04-17 RX ORDER — LABETALOL HYDROCHLORIDE 5 MG/ML
20 INJECTION, SOLUTION INTRAVENOUS
Status: DISCONTINUED | OUTPATIENT
Start: 2025-04-17 | End: 2025-04-20 | Stop reason: HOSPADM

## 2025-04-17 RX ORDER — TRANEXAMIC ACID 10 MG/ML
1000 INJECTION, SOLUTION INTRAVENOUS
Status: COMPLETED | OUTPATIENT
Start: 2025-04-17 | End: 2025-04-18

## 2025-04-17 RX ORDER — SODIUM CHLORIDE, SODIUM LACTATE, POTASSIUM CHLORIDE, AND CALCIUM CHLORIDE .6; .31; .03; .02 G/100ML; G/100ML; G/100ML; G/100ML
500 INJECTION, SOLUTION INTRAVENOUS PRN
Status: DISCONTINUED | OUTPATIENT
Start: 2025-04-17 | End: 2025-04-20

## 2025-04-17 RX ORDER — ONDANSETRON 2 MG/ML
4 INJECTION INTRAMUSCULAR; INTRAVENOUS EVERY 6 HOURS PRN
Status: DISCONTINUED | OUTPATIENT
Start: 2025-04-17 | End: 2025-04-20

## 2025-04-17 RX ORDER — NIFEDIPINE 30 MG/1
30 TABLET, EXTENDED RELEASE ORAL EVERY 12 HOURS
Status: DISCONTINUED | OUTPATIENT
Start: 2025-04-17 | End: 2025-04-20

## 2025-04-17 RX ORDER — NIFEDIPINE 10 MG/1
10 CAPSULE ORAL
Status: COMPLETED | OUTPATIENT
Start: 2025-04-17 | End: 2025-04-17

## 2025-04-17 RX ORDER — FENTANYL CITRATE 50 UG/ML
25 INJECTION, SOLUTION INTRAMUSCULAR; INTRAVENOUS
Refills: 0 | Status: DISCONTINUED | OUTPATIENT
Start: 2025-04-17 | End: 2025-04-20

## 2025-04-17 RX ORDER — LIDOCAINE HYDROCHLORIDE 10 MG/ML
30 INJECTION, SOLUTION EPIDURAL; INFILTRATION; INTRACAUDAL; PERINEURAL PRN
Status: DISCONTINUED | OUTPATIENT
Start: 2025-04-17 | End: 2025-04-20

## 2025-04-17 RX ORDER — METHYLERGONOVINE MALEATE 0.2 MG/ML
200 INJECTION INTRAVENOUS PRN
Status: DISCONTINUED | OUTPATIENT
Start: 2025-04-17 | End: 2025-04-20

## 2025-04-17 RX ORDER — NIFEDIPINE 10 MG/1
20 CAPSULE ORAL
Status: DISCONTINUED | OUTPATIENT
Start: 2025-04-17 | End: 2025-04-20 | Stop reason: HOSPADM

## 2025-04-17 RX ADMIN — SODIUM CHLORIDE, SODIUM LACTATE, POTASSIUM CHLORIDE, AND CALCIUM CHLORIDE: .6; .31; .03; .02 INJECTION, SOLUTION INTRAVENOUS at 20:36

## 2025-04-17 RX ADMIN — LABETALOL HYDROCHLORIDE 300 MG: 200 TABLET, FILM COATED ORAL at 20:53

## 2025-04-17 RX ADMIN — LABETALOL HYDROCHLORIDE 300 MG: 200 TABLET, FILM COATED ORAL at 09:10

## 2025-04-17 RX ADMIN — Medication 25 MCG: at 18:50

## 2025-04-17 RX ADMIN — FAMOTIDINE 20 MG: 20 TABLET, FILM COATED ORAL at 20:27

## 2025-04-17 RX ADMIN — PENICILLIN G POTASSIUM 5 MILLION UNITS: 5000000 INJECTION, POWDER, FOR SOLUTION INTRAMUSCULAR; INTRAVENOUS at 20:28

## 2025-04-17 RX ADMIN — NIFEDIPINE 30 MG: 30 TABLET, FILM COATED, EXTENDED RELEASE ORAL at 21:59

## 2025-04-17 RX ADMIN — PROCHLORPERAZINE EDISYLATE 10 MG: 5 INJECTION INTRAMUSCULAR; INTRAVENOUS at 18:53

## 2025-04-17 RX ADMIN — Medication 25 MCG: at 23:03

## 2025-04-17 RX ADMIN — HYDROMORPHONE HYDROCHLORIDE 1 MG: 1 INJECTION, SOLUTION INTRAMUSCULAR; INTRAVENOUS; SUBCUTANEOUS at 18:53

## 2025-04-17 RX ADMIN — BETAMETHASONE ACETATE AND BETAMETHASONE SODIUM PHOSPHATE 12 MG: 3; 3 INJECTION, SUSPENSION INTRA-ARTICULAR; INTRALESIONAL; INTRAMUSCULAR; SOFT TISSUE at 17:06

## 2025-04-17 RX ADMIN — NIFEDIPINE 10 MG: 10 CAPSULE ORAL at 20:27

## 2025-04-17 ASSESSMENT — PAIN DESCRIPTION - LOCATION: LOCATION: PELVIS;PERINEUM

## 2025-04-17 ASSESSMENT — PAIN DESCRIPTION - DESCRIPTORS: DESCRIPTORS: ACHING;DISCOMFORT;THROBBING;PRESSURE

## 2025-04-17 ASSESSMENT — PAIN SCALES - GENERAL: PAINLEVEL_OUTOF10: 10

## 2025-04-17 ASSESSMENT — PAIN DESCRIPTION - ORIENTATION: ORIENTATION: INNER;LOWER

## 2025-04-18 PROBLEM — Z3A.35 35 WEEKS GESTATION OF PREGNANCY: Status: ACTIVE | Noted: 2025-04-18

## 2025-04-18 LAB
APTT PPP: 25.8 SEC (ref 22.1–31)
BASOPHILS # BLD: 0.16 K/UL (ref 0–0.1)
BASOPHILS NFR BLD: 1 % (ref 0–1)
DIFFERENTIAL METHOD BLD: ABNORMAL
EOSINOPHIL # BLD: 0.32 K/UL (ref 0–0.4)
EOSINOPHIL NFR BLD: 2 % (ref 0–7)
ERYTHROCYTE [DISTWIDTH] IN BLOOD BY AUTOMATED COUNT: 13.5 % (ref 11.5–14.5)
FIBRINOGEN PPP-MCNC: 419 MG/DL (ref 200–475)
HCT VFR BLD AUTO: 31.7 % (ref 35–47)
HGB BLD-MCNC: 10.6 G/DL (ref 11.5–16)
IMM GRANULOCYTES # BLD AUTO: 0 K/UL
IMM GRANULOCYTES NFR BLD AUTO: 0 %
INR PPP: 0.9 (ref 0.9–1.1)
LYMPHOCYTES # BLD: 1.94 K/UL (ref 0.8–3.5)
LYMPHOCYTES NFR BLD: 12 % (ref 12–49)
MCH RBC QN AUTO: 29 PG (ref 26–34)
MCHC RBC AUTO-ENTMCNC: 33.4 G/DL (ref 30–36.5)
MCV RBC AUTO: 86.6 FL (ref 80–99)
MONOCYTES # BLD: 1.3 K/UL (ref 0–1)
MONOCYTES NFR BLD: 8 % (ref 5–13)
NEUTS BAND NFR BLD MANUAL: 2 % (ref 0–6)
NEUTS SEG # BLD: 12.48 K/UL (ref 1.8–8)
NEUTS SEG NFR BLD: 75 % (ref 32–75)
NRBC # BLD: 0.04 K/UL (ref 0–0.01)
NRBC BLD-RTO: 0.2 PER 100 WBC
PLATELET # BLD AUTO: 241 K/UL (ref 150–400)
PMV BLD AUTO: 10.2 FL (ref 8.9–12.9)
PROTHROMBIN TIME: 10 SEC (ref 9.2–11.2)
RBC # BLD AUTO: 3.66 M/UL (ref 3.8–5.2)
RBC MORPH BLD: ABNORMAL
THERAPEUTIC RANGE: NORMAL SECS (ref 58–77)
WBC # BLD AUTO: 16.2 K/UL (ref 3.6–11)

## 2025-04-18 PROCEDURE — 94761 N-INVAS EAR/PLS OXIMETRY MLT: CPT

## 2025-04-18 PROCEDURE — 0W3R7ZZ CONTROL BLEEDING IN GENITOURINARY TRACT, VIA NATURAL OR ARTIFICIAL OPENING: ICD-10-PCS | Performed by: OBSTETRICS & GYNECOLOGY

## 2025-04-18 PROCEDURE — 85610 PROTHROMBIN TIME: CPT

## 2025-04-18 PROCEDURE — 2580000003 HC RX 258: Performed by: OBSTETRICS & GYNECOLOGY

## 2025-04-18 PROCEDURE — 85384 FIBRINOGEN ACTIVITY: CPT

## 2025-04-18 PROCEDURE — 6370000000 HC RX 637 (ALT 250 FOR IP): Performed by: OBSTETRICS & GYNECOLOGY

## 2025-04-18 PROCEDURE — 85730 THROMBOPLASTIN TIME PARTIAL: CPT

## 2025-04-18 PROCEDURE — 6360000002 HC RX W HCPCS: Performed by: OBSTETRICS & GYNECOLOGY

## 2025-04-18 PROCEDURE — 1120000000 HC RM PRIVATE OB

## 2025-04-18 PROCEDURE — 2500000003 HC RX 250 WO HCPCS: Performed by: OBSTETRICS & GYNECOLOGY

## 2025-04-18 PROCEDURE — 0KQM0ZZ REPAIR PERINEUM MUSCLE, OPEN APPROACH: ICD-10-PCS | Performed by: OBSTETRICS & GYNECOLOGY

## 2025-04-18 PROCEDURE — 59400 OBSTETRICAL CARE: CPT | Performed by: OBSTETRICS & GYNECOLOGY

## 2025-04-18 PROCEDURE — 85025 COMPLETE CBC W/AUTO DIFF WBC: CPT

## 2025-04-18 PROCEDURE — 1100000000 HC RM PRIVATE

## 2025-04-18 PROCEDURE — 7220000101 HC DELIVERY VAGINAL/SINGLE: Performed by: OBSTETRICS & GYNECOLOGY

## 2025-04-18 PROCEDURE — 59160 D & C AFTER DELIVERY: CPT | Performed by: OBSTETRICS & GYNECOLOGY

## 2025-04-18 PROCEDURE — 76815 OB US LIMITED FETUS(S): CPT | Performed by: OBSTETRICS & GYNECOLOGY

## 2025-04-18 PROCEDURE — 7210000100 HC LABOR FEE PER 1 HR: Performed by: OBSTETRICS & GYNECOLOGY

## 2025-04-18 PROCEDURE — 36415 COLL VENOUS BLD VENIPUNCTURE: CPT

## 2025-04-18 RX ORDER — 0.9 % SODIUM CHLORIDE 0.9 %
500 INTRAVENOUS SOLUTION INTRAVENOUS ONCE
Status: COMPLETED | OUTPATIENT
Start: 2025-04-18 | End: 2025-04-18

## 2025-04-18 RX ORDER — IBUPROFEN 800 MG/1
800 TABLET, FILM COATED ORAL EVERY 8 HOURS SCHEDULED
Status: DISCONTINUED | OUTPATIENT
Start: 2025-04-18 | End: 2025-04-20 | Stop reason: HOSPADM

## 2025-04-18 RX ORDER — SODIUM CHLORIDE 9 MG/ML
INJECTION, SOLUTION INTRAVENOUS CONTINUOUS
Status: DISCONTINUED | OUTPATIENT
Start: 2025-04-18 | End: 2025-04-20 | Stop reason: HOSPADM

## 2025-04-18 RX ORDER — IBUPROFEN 800 MG/1
800 TABLET, FILM COATED ORAL EVERY 8 HOURS SCHEDULED
Status: DISCONTINUED | OUTPATIENT
Start: 2025-04-18 | End: 2025-04-18

## 2025-04-18 RX ORDER — LIDOCAINE HYDROCHLORIDE 10 MG/ML
INJECTION, SOLUTION EPIDURAL; INFILTRATION; INTRACAUDAL; PERINEURAL
Status: DISPENSED
Start: 2025-04-18 | End: 2025-04-19

## 2025-04-18 RX ORDER — LIDOCAINE HYDROCHLORIDE 10 MG/ML
INJECTION, SOLUTION INFILTRATION; PERINEURAL
Status: DISPENSED
Start: 2025-04-18 | End: 2025-04-19

## 2025-04-18 RX ADMIN — Medication 2 MILLI-UNITS/MIN: at 03:04

## 2025-04-18 RX ADMIN — SODIUM CHLORIDE, SODIUM LACTATE, POTASSIUM CHLORIDE, AND CALCIUM CHLORIDE: .6; .31; .03; .02 INJECTION, SOLUTION INTRAVENOUS at 10:31

## 2025-04-18 RX ADMIN — SODIUM CHLORIDE 2.5 MILLION UNITS: 9 INJECTION, SOLUTION INTRAVENOUS at 04:13

## 2025-04-18 RX ADMIN — SODIUM CHLORIDE 2.5 MILLION UNITS: 9 INJECTION, SOLUTION INTRAVENOUS at 00:34

## 2025-04-18 RX ADMIN — TRANEXAMIC ACID 1000 MG: 10 INJECTION, SOLUTION INTRAVENOUS at 16:33

## 2025-04-18 RX ADMIN — SODIUM CHLORIDE 2.5 MILLION UNITS: 9 INJECTION, SOLUTION INTRAVENOUS at 09:03

## 2025-04-18 RX ADMIN — ACETAMINOPHEN 1000 MG: 500 TABLET ORAL at 11:55

## 2025-04-18 RX ADMIN — LABETALOL HYDROCHLORIDE 300 MG: 200 TABLET, FILM COATED ORAL at 07:41

## 2025-04-18 RX ADMIN — NIFEDIPINE 30 MG: 30 TABLET, FILM COATED, EXTENDED RELEASE ORAL at 21:21

## 2025-04-18 RX ADMIN — LABETALOL HYDROCHLORIDE 300 MG: 200 TABLET, FILM COATED ORAL at 14:50

## 2025-04-18 RX ADMIN — MISOPROSTOL 800 MCG: 200 TABLET ORAL at 16:40

## 2025-04-18 RX ADMIN — SODIUM CHLORIDE 500 ML: 0.9 INJECTION, SOLUTION INTRAVENOUS at 13:57

## 2025-04-18 RX ADMIN — Medication 87.3 MILLI-UNITS/MIN: at 17:05

## 2025-04-18 RX ADMIN — SODIUM CHLORIDE 2.5 MILLION UNITS: 9 INJECTION, SOLUTION INTRAVENOUS at 12:52

## 2025-04-18 RX ADMIN — WATER 2000 MG: 1 INJECTION INTRAMUSCULAR; INTRAVENOUS; SUBCUTANEOUS at 20:16

## 2025-04-18 RX ADMIN — SODIUM CHLORIDE, SODIUM LACTATE, POTASSIUM CHLORIDE, AND CALCIUM CHLORIDE: .6; .31; .03; .02 INJECTION, SOLUTION INTRAVENOUS at 03:04

## 2025-04-18 RX ADMIN — NIFEDIPINE 30 MG: 30 TABLET, FILM COATED, EXTENDED RELEASE ORAL at 09:11

## 2025-04-18 RX ADMIN — IBUPROFEN 800 MG: 800 TABLET, FILM COATED ORAL at 18:35

## 2025-04-18 ASSESSMENT — PAIN DESCRIPTION - LOCATION
LOCATION: ABDOMEN
LOCATION: ABDOMEN
LOCATION: HEAD

## 2025-04-18 ASSESSMENT — PAIN SCALES - GENERAL
PAINLEVEL_OUTOF10: 6
PAINLEVEL_OUTOF10: 5
PAINLEVEL_OUTOF10: 6
PAINLEVEL_OUTOF10: 8

## 2025-04-18 ASSESSMENT — PAIN DESCRIPTION - ORIENTATION
ORIENTATION: LOWER
ORIENTATION: ANTERIOR
ORIENTATION: ANTERIOR

## 2025-04-18 ASSESSMENT — PAIN DESCRIPTION - DESCRIPTORS
DESCRIPTORS: CRAMPING
DESCRIPTORS: CRAMPING

## 2025-04-18 ASSESSMENT — PAIN DESCRIPTION - FREQUENCY: FREQUENCY: INTERMITTENT

## 2025-04-18 NOTE — L&D DELIVERY NOTE
Cristobal, Female Loni Ivey [[id number]]      Labor Events      Cervical Ripening Date/Time:        Rupture Date/Time:   08:38:00   Rupture Type: Intact  Fluid Color: Clear  Fluid Odor: None  Fluid Volume: Moderate  Induction: AROM, Misoprostol  Augmentation: AROM       Anesthesia    Method: Nitrous Oxide       Labor Event Times      Labor onset date/time:        Dilation complete date/time:  25 16:02:00     Start pushing date/time:  2025 16:03:00   Decision date/time (emergent ):            Delivery Details      Delivery Date: 25 Delivery Time: 16:12:00   Delivery Type: Vaginal, Spontaneous              [place] Presentation    Presentation: Vertex  _: Occiput  _: Posterior       Shoulder Dystocia    Shoulder Dystocia Present?: No       Assisted Delivery Details    Forceps Attempted?: No  Vacuum Extractor Attempted?: No                           Cord    Vessels: 3 Vessels  Complications: None  Delayed Cord Clamping?: Yes  Cord Clamped Date/Time: 2025 16:22:24  Cord Blood Disposition: Lab  Gases Sent?: No              Placenta    Date/Time: 2025 16:23:00  Removal: Expressed  Appearance: Intact  Disposition: Pathology       Lacerations           Vaginal Counts      Intial Sponge Count: Correct Intial Needles Count: Correct Intial Instruments Count: Correct   Final Sponges Count: Correct Final Needles  Count: Correct Final Instruments Count: Correct   Final Count Personnel: DR ALVARADO  Final Count Verified By: LISA HARRIS  Accurate Final Count?: Yes       Blood Loss  Mother: Loni Jain #[id number]     Start of Mother's Information      Delivery Blood Loss   Intrapartum & Postpartum: 25 0412 - 25 1722    Delivery Admission: 25 0822 - 25 1722         Intrapartum & Postpartum Delivery Admission    None                  End of Mother's Information  Mother: Loni Jain #[id number]                Delivery Providers    Delivering clinician: Jeronimo

## 2025-04-18 NOTE — CARE COORDINATION
4/18/2025  9:13 AM    Care Management Progress Note    Reason for Admission:   Hypertension affecting pregnancy, antepartum, unspecified trimester [O16.9]  Hypertension complicating pregnancy, third trimester [O16.3]         Patient Admission Status: Inpatient    Transition of care plan:    CM met with BRITTNEY to complete initial assessment and begin discharge planning.  MOB verified and confirmed demographics.  BRITTNEY lives with family, at the address on file. BRITTNEY reports she has good family support, and feels like she has the support she needs when she returns home.  BRITTNEY plans to breast feed baby and is in the process of ordering a pump to use at home.  [facility], will provide follow up care for infant. BRITTNEY has car seat, bassinet/crib, clothing, bottles and all necessary supplies for baby. MOB confirms, infant will be added to [employer] insurance.      No further CM needs noted at this time.        04/18/25 0912   Service Assessment   Patient Orientation Alert and Oriented   Cognition Alert   History Provided By Patient   Primary Caregiver Self   Support Systems Spouse/Significant Other   PCP Verified by CM Yes   Last Visit to PCP Within last 3 months   Prior Functional Level Independent in ADLs/IADLs   Current Functional Level Independent in ADLs/IADLs   Can patient return to prior living arrangement Yes   Ability to make needs known: Good   Family able to assist with home care needs: Yes   Would you like for me to discuss the discharge plan with any other family members/significant others, and if so, who? No   Financial Resources Other (Comment)     Jun Mckeon CM

## 2025-04-19 LAB
ERYTHROCYTE [DISTWIDTH] IN BLOOD BY AUTOMATED COUNT: 13.5 % (ref 11.5–14.5)
HCT VFR BLD AUTO: 25.5 % (ref 35–47)
HGB BLD-MCNC: 8.5 G/DL (ref 11.5–16)
MCH RBC QN AUTO: 29 PG (ref 26–34)
MCHC RBC AUTO-ENTMCNC: 33.3 G/DL (ref 30–36.5)
MCV RBC AUTO: 87 FL (ref 80–99)
NRBC # BLD: 0.02 K/UL (ref 0–0.01)
NRBC BLD-RTO: 0.1 PER 100 WBC
PLATELET # BLD AUTO: 221 K/UL (ref 150–400)
PMV BLD AUTO: 10.5 FL (ref 8.9–12.9)
RBC # BLD AUTO: 2.93 M/UL (ref 3.8–5.2)
WBC # BLD AUTO: 15.5 K/UL (ref 3.6–11)

## 2025-04-19 PROCEDURE — 2720000010 HC SURG SUPPLY STERILE

## 2025-04-19 PROCEDURE — 6370000000 HC RX 637 (ALT 250 FOR IP): Performed by: OBSTETRICS & GYNECOLOGY

## 2025-04-19 PROCEDURE — 2500000003 HC RX 250 WO HCPCS: Performed by: OBSTETRICS & GYNECOLOGY

## 2025-04-19 PROCEDURE — 36415 COLL VENOUS BLD VENIPUNCTURE: CPT

## 2025-04-19 PROCEDURE — 6360000002 HC RX W HCPCS: Performed by: OBSTETRICS & GYNECOLOGY

## 2025-04-19 PROCEDURE — 85027 COMPLETE CBC AUTOMATED: CPT

## 2025-04-19 PROCEDURE — 94761 N-INVAS EAR/PLS OXIMETRY MLT: CPT

## 2025-04-19 PROCEDURE — 1120000000 HC RM PRIVATE OB

## 2025-04-19 RX ADMIN — DOCUSATE SODIUM 100 MG: 100 CAPSULE, LIQUID FILLED ORAL at 07:58

## 2025-04-19 RX ADMIN — IBUPROFEN 800 MG: 800 TABLET, FILM COATED ORAL at 15:57

## 2025-04-19 RX ADMIN — ACETAMINOPHEN 1000 MG: 500 TABLET ORAL at 15:57

## 2025-04-19 RX ADMIN — DOCUSATE SODIUM 100 MG: 100 CAPSULE, LIQUID FILLED ORAL at 19:27

## 2025-04-19 RX ADMIN — ACETAMINOPHEN 1000 MG: 500 TABLET ORAL at 07:57

## 2025-04-19 RX ADMIN — WATER 2000 MG: 1 INJECTION INTRAMUSCULAR; INTRAVENOUS; SUBCUTANEOUS at 04:30

## 2025-04-19 RX ADMIN — IBUPROFEN 800 MG: 800 TABLET, FILM COATED ORAL at 07:58

## 2025-04-19 ASSESSMENT — PAIN - FUNCTIONAL ASSESSMENT: PAIN_FUNCTIONAL_ASSESSMENT: ACTIVITIES ARE NOT PREVENTED

## 2025-04-19 ASSESSMENT — PAIN SCALES - GENERAL
PAINLEVEL_OUTOF10: 4
PAINLEVEL_OUTOF10: 6

## 2025-04-19 ASSESSMENT — PAIN DESCRIPTION - DESCRIPTORS
DESCRIPTORS: CRAMPING
DESCRIPTORS: BURNING;SORE

## 2025-04-19 ASSESSMENT — PAIN DESCRIPTION - LOCATION: LOCATION: ABDOMEN

## 2025-04-19 ASSESSMENT — PAIN DESCRIPTION - ORIENTATION: ORIENTATION: LOWER

## 2025-04-19 NOTE — LACTATION NOTE
This note was copied from a baby's chart.  Mother reports that she is ready to start pumping for her  in the NICU, she was shown how to use the breast pump, clean her pump parts, and store her breast milk. LC encouraged mother to hand express with pumping sessions and to keep a routine of pumping every 2-3 hours to support milk supply. Mother understanding. Supplies provided, a lactation  booklet was provided at this time that included outpatient resources. A case management consult was placed due to mother not having a breast pump at home.    Pumping:  Guidelines for pumping, milk collection and storage, proper cleaning of pump parts all reviewed.  How to establish and maintain breast milk supply through pumping reviewed.  Differences between hospital grade rental pumps vs store bought double electric/hand pumps discussed.  Set up pumping with double electric set up.  Assisted with pump session.   List of area pump rental locations and lactation support services provided.    Reviewed effects/risks of late  birth on initiation of breastfeeding including infant's sleepiness, ineffective or missed breastfeedings, infant's decreased stamina to sustain prolonged latch and effective breastfeeding, decreased energy reserves related to low birth wt and inability to stimulate milk supply.   Recommended interventions include skin to skin bonding at breast, hand expression of colostrum as infant rests at breast and initiation of breastfeeding as infant is able, initiation of pumping regimen to begin within 6 hours of birth as mom is able; complement/supplement feeding as guided by neonatologist.     Pt will successfully establish breastfeeding by feeding in response to early feeding cues   or wake every 3h, will obtain deep latch, and will keep log of feedings/output.  Taught to BF at hunger cues and or q 2-3 hrs and to offer 10-20 drops of hand expressed colostrum at any non-feeds.      Left Breast: Soft  Left  PO) Take 20 mg by mouth daily      amlodipine (NORVASC) 5 MG tablet Take 2.5 mg by mouth daily       zolpidem (AMBIEN) 5 MG tablet Take 10 mg by mouth nightly as needed.  Cholecalciferol (VITAMIN D3) 2000 UNITS CAPS Take 1 capsule by mouth daily      Nutritional Supplements (JUICE PLUS FIBRE PO) Take  by mouth. No current facility-administered medications for this visit. Allergies   Allergen Reactions    Shellfish-Derived Products Nausea And Vomiting           Review of Systems   Constitutional: Negative for activity change, chills, diaphoresis and fever. HENT: Negative for sore throat, trouble swallowing and voice change. Eyes: Negative for photophobia and visual disturbance. Respiratory: Negative for cough, shortness of breath and wheezing. Cardiovascular: Negative for chest pain, palpitations and leg swelling. Gastrointestinal: Positive for abdominal pain. Negative for anal bleeding, blood in stool, constipation, diarrhea, nausea and vomiting. Endocrine: Negative for cold intolerance, heat intolerance, polydipsia and polyuria. Genitourinary: Negative for dysuria, frequency and hematuria. Musculoskeletal: Negative for joint swelling, myalgias and neck stiffness. Skin: Negative for color change and rash. Neurological: Negative for seizures, speech difficulty, light-headedness and numbness. Hematological: Negative for adenopathy. Does not bruise/bleed easily. OBJECTIVE:    /76 (Site: Left Upper Arm, Position: Sitting, Cuff Size: Medium Adult)   Pulse 89   Ht 5' 5\" (1.651 m)   Wt 173 lb 11.2 oz (78.8 kg)   BMI 28.91 kg/m²    Body mass index is 28.91 kg/m². Physical Exam   Constitutional: She is oriented to person, place, and time. She appears well-developed and well-nourished. No distress. HENT:   Head: Normocephalic and atraumatic. Eyes: Pupils are equal, round, and reactive to light. Conjunctivae and EOM are normal. No scleral icterus.    Neck: Normal range of motion. No JVD present. No tracheal deviation present. No thyromegaly present. Cardiovascular: Normal rate, regular rhythm, normal heart sounds and intact distal pulses. Exam reveals no gallop and no friction rub. No murmur heard. Pulmonary/Chest: Effort normal. No stridor. No respiratory distress. She has no wheezes. She has no rales. She exhibits no tenderness. Abdominal: Soft. Bowel sounds are normal. She exhibits mass. She exhibits no distension. There is no tenderness. There is no rebound and no guarding. Musculoskeletal: Normal range of motion. She exhibits no edema or tenderness. Lymphadenopathy:     She has no cervical adenopathy. Neurological: She is alert and oriented to person, place, and time. No cranial nerve deficit. Coordination normal.   Skin: Skin is warm and dry. No rash noted. She is not diaphoretic. No erythema. No pallor. Psychiatric: Her behavior is normal. Judgment and thought content normal.   Vitals reviewed. ASSESSMENT & PLAN:    1. Abdominal wall abscess         Cellulitis, will Switch to Septra DS, and triple Ab Ointm, and probiotic and follow in 10 days. Patient counseled on the risks, benefits, and alternatives of treatment plan at length while in the office today. Patient states an understanding and willingness to proceed with the plan. Orders Placed This Encounter   Medications    sulfamethoxazole-trimethoprim (BACTRIM DS) 800-160 MG per tablet     Sig: Take 1 tablet by mouth 2 times daily for 7 days     Dispense:  14 tablet     Refill:  0    Lactobacillus (PROBIOTIC ACIDOPHILUS) TABS     Sig: Take 1 tablet by mouth 2 times daily     Dispense:  60 tablet     Refill:  0    neomycin-bacitracin-polymyxin (NEOSPORIN) 5-400-5000 ointment     Sig: Apply topically 4 times daily. Dispense:  1 Tube     Refill:  3         Follow Up:  Return in about 10 days (around 5/27/2019) for Follow up Symptoms.       Alex Pardo MD, FACS,

## 2025-04-20 VITALS
OXYGEN SATURATION: 99 % | SYSTOLIC BLOOD PRESSURE: 128 MMHG | RESPIRATION RATE: 16 BRPM | HEART RATE: 74 BPM | TEMPERATURE: 98.4 F | DIASTOLIC BLOOD PRESSURE: 72 MMHG

## 2025-04-20 LAB
ERYTHROCYTE [DISTWIDTH] IN BLOOD BY AUTOMATED COUNT: 13.6 % (ref 11.5–14.5)
HCT VFR BLD AUTO: 24.6 % (ref 35–47)
HGB BLD-MCNC: 8.2 G/DL (ref 11.5–16)
MCH RBC QN AUTO: 29.5 PG (ref 26–34)
MCHC RBC AUTO-ENTMCNC: 33.3 G/DL (ref 30–36.5)
MCV RBC AUTO: 88.5 FL (ref 80–99)
NRBC # BLD: 0.05 K/UL (ref 0–0.01)
NRBC BLD-RTO: 0.4 PER 100 WBC
PLATELET # BLD AUTO: 239 K/UL (ref 150–400)
PMV BLD AUTO: 10.5 FL (ref 8.9–12.9)
RBC # BLD AUTO: 2.78 M/UL (ref 3.8–5.2)
WBC # BLD AUTO: 11.2 K/UL (ref 3.6–11)

## 2025-04-20 PROCEDURE — 6370000000 HC RX 637 (ALT 250 FOR IP): Performed by: OBSTETRICS & GYNECOLOGY

## 2025-04-20 PROCEDURE — 85027 COMPLETE CBC AUTOMATED: CPT

## 2025-04-20 PROCEDURE — 36415 COLL VENOUS BLD VENIPUNCTURE: CPT

## 2025-04-20 PROCEDURE — 94761 N-INVAS EAR/PLS OXIMETRY MLT: CPT

## 2025-04-20 RX ORDER — TRANEXAMIC ACID 10 MG/ML
1000 INJECTION, SOLUTION INTRAVENOUS
Status: DISCONTINUED | OUTPATIENT
Start: 2025-04-20 | End: 2025-04-20 | Stop reason: HOSPADM

## 2025-04-20 RX ORDER — ONDANSETRON 4 MG/1
4 TABLET, ORALLY DISINTEGRATING ORAL EVERY 6 HOURS PRN
Status: DISCONTINUED | OUTPATIENT
Start: 2025-04-20 | End: 2025-04-20 | Stop reason: HOSPADM

## 2025-04-20 RX ORDER — FAMOTIDINE 20 MG/1
20 TABLET, FILM COATED ORAL 2 TIMES DAILY PRN
Status: DISCONTINUED | OUTPATIENT
Start: 2025-04-20 | End: 2025-04-20 | Stop reason: HOSPADM

## 2025-04-20 RX ORDER — SIMETHICONE 80 MG
80 TABLET,CHEWABLE ORAL EVERY 6 HOURS PRN
Status: DISCONTINUED | OUTPATIENT
Start: 2025-04-20 | End: 2025-04-20 | Stop reason: HOSPADM

## 2025-04-20 RX ORDER — METHYLERGONOVINE MALEATE 0.2 MG/ML
200 INJECTION INTRAVENOUS PRN
Status: DISCONTINUED | OUTPATIENT
Start: 2025-04-20 | End: 2025-04-20 | Stop reason: HOSPADM

## 2025-04-20 RX ORDER — SENNA AND DOCUSATE SODIUM 50; 8.6 MG/1; MG/1
1 TABLET, FILM COATED ORAL DAILY
Status: DISCONTINUED | OUTPATIENT
Start: 2025-04-20 | End: 2025-04-20 | Stop reason: HOSPADM

## 2025-04-20 RX ORDER — IBUPROFEN 800 MG/1
800 TABLET, FILM COATED ORAL EVERY 8 HOURS SCHEDULED
Qty: 120 TABLET | Refills: 3 | Status: SHIPPED | OUTPATIENT
Start: 2025-04-20

## 2025-04-20 RX ORDER — ACETAMINOPHEN 500 MG
1000 TABLET ORAL EVERY 8 HOURS
Status: DISCONTINUED | OUTPATIENT
Start: 2025-04-20 | End: 2025-04-20 | Stop reason: HOSPADM

## 2025-04-20 RX ORDER — FERROUS SULFATE 325(65) MG
325 TABLET, DELAYED RELEASE (ENTERIC COATED) ORAL
Qty: 90 TABLET | Refills: 3 | Status: SHIPPED | OUTPATIENT
Start: 2025-04-20

## 2025-04-20 RX ORDER — OXYCODONE HYDROCHLORIDE 5 MG/1
10 TABLET ORAL EVERY 4 HOURS PRN
Status: DISCONTINUED | OUTPATIENT
Start: 2025-04-20 | End: 2025-04-20 | Stop reason: HOSPADM

## 2025-04-20 RX ORDER — LACTULOSE 10 G/15ML
10 SOLUTION ORAL 2 TIMES DAILY PRN
Status: DISCONTINUED | OUTPATIENT
Start: 2025-04-20 | End: 2025-04-20

## 2025-04-20 RX ORDER — SODIUM CHLORIDE 9 MG/ML
INJECTION, SOLUTION INTRAVENOUS PRN
Status: DISCONTINUED | OUTPATIENT
Start: 2025-04-20 | End: 2025-04-20 | Stop reason: HOSPADM

## 2025-04-20 RX ORDER — SODIUM CHLORIDE 0.9 % (FLUSH) 0.9 %
5-40 SYRINGE (ML) INJECTION EVERY 12 HOURS SCHEDULED
Status: DISCONTINUED | OUTPATIENT
Start: 2025-04-20 | End: 2025-04-20

## 2025-04-20 RX ORDER — LABETALOL 300 MG/1
300 TABLET, FILM COATED ORAL 3 TIMES DAILY
Qty: 30 TABLET | Refills: 1 | Status: SHIPPED | OUTPATIENT
Start: 2025-04-20

## 2025-04-20 RX ORDER — SODIUM CHLORIDE 0.9 % (FLUSH) 0.9 %
5-40 SYRINGE (ML) INJECTION PRN
Status: DISCONTINUED | OUTPATIENT
Start: 2025-04-20 | End: 2025-04-20

## 2025-04-20 RX ORDER — MISOPROSTOL 200 UG/1
200 TABLET ORAL PRN
Status: DISCONTINUED | OUTPATIENT
Start: 2025-04-20 | End: 2025-04-20 | Stop reason: HOSPADM

## 2025-04-20 RX ORDER — OXYCODONE HYDROCHLORIDE 5 MG/1
5 TABLET ORAL EVERY 4 HOURS PRN
Status: DISCONTINUED | OUTPATIENT
Start: 2025-04-20 | End: 2025-04-20 | Stop reason: HOSPADM

## 2025-04-20 RX ORDER — POLYETHYLENE GLYCOL 3350 17 G/17G
17 POWDER, FOR SOLUTION ORAL DAILY PRN
Status: DISCONTINUED | OUTPATIENT
Start: 2025-04-20 | End: 2025-04-20 | Stop reason: HOSPADM

## 2025-04-20 RX ORDER — ONDANSETRON 2 MG/ML
4 INJECTION INTRAMUSCULAR; INTRAVENOUS EVERY 6 HOURS PRN
Status: DISCONTINUED | OUTPATIENT
Start: 2025-04-20 | End: 2025-04-20 | Stop reason: HOSPADM

## 2025-04-20 RX ADMIN — IBUPROFEN 800 MG: 800 TABLET, FILM COATED ORAL at 10:51

## 2025-04-20 RX ADMIN — ACETAMINOPHEN 1000 MG: 500 TABLET ORAL at 17:22

## 2025-04-20 RX ADMIN — LABETALOL HYDROCHLORIDE 300 MG: 200 TABLET, FILM COATED ORAL at 07:25

## 2025-04-20 RX ADMIN — SENNOSIDES, DOCUSATE SODIUM 1 TABLET: 50; 8.6 TABLET, FILM COATED ORAL at 17:22

## 2025-04-20 RX ADMIN — IBUPROFEN 800 MG: 800 TABLET, FILM COATED ORAL at 00:38

## 2025-04-20 RX ADMIN — LABETALOL HYDROCHLORIDE 300 MG: 200 TABLET, FILM COATED ORAL at 17:23

## 2025-04-20 RX ADMIN — ACETAMINOPHEN 1000 MG: 500 TABLET ORAL at 04:35

## 2025-04-20 ASSESSMENT — PAIN DESCRIPTION - DESCRIPTORS
DESCRIPTORS: SORE;CRAMPING
DESCRIPTORS: SORE
DESCRIPTORS: SORE;ACHING
DESCRIPTORS: SORE

## 2025-04-20 ASSESSMENT — PAIN SCALES - GENERAL
PAINLEVEL_OUTOF10: 2

## 2025-04-20 ASSESSMENT — PAIN DESCRIPTION - LOCATION
LOCATION: ABDOMEN
LOCATION: ABDOMEN;PERINEUM
LOCATION: PERINEUM
LOCATION: ABDOMEN

## 2025-04-20 ASSESSMENT — PAIN DESCRIPTION - ORIENTATION
ORIENTATION: LOWER

## 2025-04-20 ASSESSMENT — PAIN - FUNCTIONAL ASSESSMENT
PAIN_FUNCTIONAL_ASSESSMENT: ACTIVITIES ARE NOT PREVENTED
PAIN_FUNCTIONAL_ASSESSMENT: ACTIVITIES ARE NOT PREVENTED

## 2025-04-20 NOTE — PROGRESS NOTES
0210: SBAR received from Marlon LAO. Pt care assumed at this time. Baby tracing high at umbilicus, Marlon RN to notify Luis PRINGLE and request USD scan before starting Pitocin    0222: Luis PRINGLE at [facility] with this RN confirming fetus is vertex at this time. Continue with  POC to start Pitocin. MD notified that pt has been having VS taken with adult long cuff, but arm is outside indicated range. This RN measuring pt arm= 36cm  and fits inside Large cuff range, BP cuff switched , VS taken with new cuff and MD aware.     0700: Bedside and Verbal shift change report given to Abundio RN  (oncoming nurse) by Liss LAO  (offgoing nurse). Report included the following information Nurse Handoff Report, Adult Overview, Recent Results, Quality Measures, and Event Log.    
0700 Bedside and Verbal shift change report given to MARIANA Gomez RN (oncoming nurse) by MARIANA Barraza RN (offgoing nurse). Report included the following information Nurse Handoff Report, Index, Adult Overview, Surgery Report, Intake/Output, MAR, and Recent Results.      1900 Bedside and Verbal shift change report given to LUMA Hawkins RN (oncoming nurse) by MARIANA Gomez RN (offgoing nurse). Report included the following information Nurse Handoff Report, Index, Adult Overview, Surgery Report, Intake/Output, MAR, and Recent Results.    
0710: Bedside and Verbal shift change report given to Dana SNEED RN (oncoming nurse) by Britany ROMO RN (offgoing nurse). Report included the following information Nurse Handoff Report, Adult Overview, Intake/Output, MAR, Recent Results, and Med Rec Status.     0815: Reactive NST verified by Ivanna ROMO RN.    1300: Patient's /90.     1310: Dr. Wilkerson informed of patient's BP and recent lab results. MD at bedside. Per MD, patient to be admitted inpatient. VORB per MD follow nifedipine protocol. 10mg PO nifedipine administered at 1319.    1325: This RN call with [facility] requesting appointment time. Patient to be seen at 1515 today.     1457: This RN call with Dr. Wilkerson re current mid-range Bps taken q20 mins. Okay to resume q 4hr Bps per MD.    1510: This RN transporting patient to [facility] via wheelchair.    1645: Patient back in room 214.    1905: Verbal shift change report given to Celia SNEED RN (oncoming nurse) by Dana SNEED RN (offgoing nurse). Report included the following information Nurse Handoff Report, Adult Overview, Intake/Output, MAR, Recent Results, and Med Rec Status.     
0715 Bedside and Verbal shift change report given to JANEL Lugo RN and MAISHA Webb RN (oncoming nurse) by YVROSE Piña RN (offgoing nurse). Report included the following information Nurse Handoff Report, Index, Adult Overview, Intake/Output, MAR, and Recent Results. This RN orienting MAISHA Webb RN during this shift    0800 NST reactive    1500 Bedside and Verbal shift change report given to VIVIANA Christensen RN (oncoming nurse) by JANEL Lugo RN and MAISHA Webb RN (offgoing nurse). Report included the following information Nurse Handoff Report, Index, Adult Overview, Intake/Output, MAR, and Recent Results.     
0715: Bedside and Verbal shift change report given to Eris Webb RN and Karina Lugo RN (oncoming nurse) by Celai SNEED RN (offgoing nurse). Report included the following information Nurse Handoff Report, Index, Adult Overview, Intake/Output, MAR, Recent Results, and Event Log.     0722: Pt put on monitor to start NST at this time. Pt denies headache or blurry vision. Pt endorces +FM.     0800: Reactive NST. PT taken off of the monitor at this time.     1514: Bedside and Verbal shift change report given to Alena VALENCIA RN (oncoming nurse) by Eris HUDSON RN and Karina Lugo RN (offgoing nurse). Report included the following information Nurse Handoff Report, Index, Adult Overview, Intake/Output, MAR, Recent Results, and Event Log.    
0822- Pt arrives to the unit ambulatory and stable set from office for Pre-eclampsia work up. Pt reports taking 100mg of her personal labetalol upon arrival to unit     1400- Bedside shift change report given to Mariano RN (oncoming nurse) by Ade RN (offgoing nurse). Report included the following information Nurse Handoff Report, Adult Overview, Intake/Output, and MAR.     
0924 IV started with labs drawn per order, LINDA LUJAN (instructor) at bedside   
1505 Pt transported to [facility] via wheelchair for appointment.     1725 Dr. Wilkerson gave VORB for NST BID; order modified.     1905 Bedside and Verbal shift change report given to GERALD Barraza RN (oncoming nurse) by RED Quintana RN (offgoing nurse). Report included the following information Nurse Handoff Report, Intake/Output, MAR, and Recent Results.    
1550: This RN and MD Wilkerson at patient's bedside at this time for patient assessment, education, and POC.     1840: This RN notified MD Smith of patient current status and pain medication request. MD GILLILAND medication orders, see MAR.     2015: This RN notified MD Smith of patient's current severe BPs, MD GILLILAND to add in and provide patient with nifedipine protocol, see MAR.     1852: This RN notifed MD Smith of patient current BP MD GILLILAND to give patient her scheduled labetalol now, retake patient BP in an hour, and notify MD of retake.     2054: This RN and MD Smith at patient's bedside at this time for patient assessment, education, and POC.     5154: This RN notifed MD Smith of patient's current BP, MD to add BP medication, see MAR.   
1900 Bedside and Verbal shift change report given to LUMA Hawkins RN (oncoming nurse) by MARIANA Gomez RN (offgoing nurse). Report included the following information Nurse Handoff Report, Index, Adult Overview, Surgery Report, Intake/Output, MAR, and Recent Results.      [id number] Reactive NST obtained at this time, reviewed by DARLENE Piña RN.    0700 Bedside and Verbal shift change report given to ROMINA Campo RN (oncoming nurse) by LUMA Hawkins RN (offgoing nurse). Report included the following information Nurse Handoff Report, Index, Adult Overview, Surgery Report, Intake/Output, MAR, and Recent Results.    
1900: Bedside and Verbal shift change report given to Sole RN (oncoming nurse) by Lakshmi RN (offgoing nurse). Report included the following information Nurse Handoff Report, Index, Adult Overview, Intake/Output, MAR, Recent Results, and Quality Measures.     1922: Assumed care of pt resting comfortably in bed. Endorses positive fetal mov't. Denies LOF, vaginal bleeding, contractions, headaches, vision changes, epigastric pain, and/or N/V.    1949: Reactive NST verified w/ E. Wang LAO.    8115: Bedside and Verbal shift change report given to Lilliam LAO (oncoming nurse) by Sole RN (offgoing nurse). Report included the following information Nurse Handoff Report, Index, Adult Overview, Intake/Output, MAR, Recent Results, and Quality Measures.     
1900: Bedside report received from Wilma LAO, POC discussed, patient currently with sarahi in, sarahi suction reviewed, off going RN stated she would call Dr. Rojas to come bedside for patient evaluation of bleeding.     1925: Dr. Rojas bedside for patient evaluation/evaluation of patients bleeding/sarahi. Bleeding currently at 220 with full tubing with blood, MD stated seal seems to be in place and since bleeding has slowed down (NO current dripping into canister) keep the sarahi in place and he will come re-evaluate in 1 hr. MD also ordered IV abx.     2004: Dr. Rojas requested bedside due to bleeding now at 250 in canister. MD stated he would come back in 30 min to evaluate. No active bleeding noted around the sarahi.     2100: Dr. Rojas bedside to evaluate sarahi, bleeding has now seemed to stabilized at this time. MD stated patient can eat. MD gave verbal orders to turn suction off at 2130 and if bleeding still remains stable sarahi can be removed at 2200.    2119: Call to Dr. Rojas, orders received to give nighttime dose of nifedipine, and hold labetalol for 125/75 or lower.     2130: Suction turned off at this time.     2215: Sarahi removed at this time, blood from tubing added to canister, total qbl from sarahi since placement is 350. Patients uterus remains firm at u with scant amount of bleeding. Esthela SAN charge nurse assisted this RN in Sarahi removal.     0000: Transferred patient to mother baby unit via wheelchair accompanied by FOB. Report given to Rosie VALENCIA RN, POC discussed, bleeding checked, patient stable, patient care transferred at this time.       
9:13 AM Patient states that she is dizzy upon standing and ambulation today. She states that yesterday she felt slightly dizzy, but today it is worse. Dr. Yin on unit rounding now, will notify her. Encouraged patient to call for assistance with ambulation and she is wearing yellow socks. Encouraged patient to sit at the edge of the bed for one minute before standing. Patient verbalizes understanding.     9:28 AM Order placed by Dr. Yin for repeat CBC. Patient is in NICU visiting baby now.     10:52 AM Patient in her room, just returned from NICU. Hold procardia per Dr. Yin and discontinue all future doses. Drawing CBC now.    11:08 AM Unsuccessful lab stick for CBC. Patient's hands/arms edematous. Patient states she feels dehydrated and requests to drink some water/visit baby in NICU and have labs reattempted when she returns to [facility]U.       1:00 PM Patient back in her room. Unsuccessful reattempt for CBC. Juan Carlos Booker, L&D RN on unit now, she will attempt labs.     1:15 PM CBC sent to lab for processing.     2:00 PM CBC resulted, Dr. Yin notified and responded w/ no new orders.     3:25 PM Patient in NICU, will administer Tylenol and Labetalol per MAR when patient returns.     4:00 PM Discharge order placed by MD.    5:24 PM    Patient off unit in stable condition via wheelchair with family for discharge home per Dr. Yin. Pt is to follow-up in 1 weeks and is aware. Prescriptions given to patient. Patient denies any HA, Dizziness, N/V or pain at this time. Infant placed in car seat by mother. Discharge teaching completed and discharge instructions signed and given to patients without any further questions at this time.     
Ante Partum Progress Note    Loni Jain  34w5d        Patient states she has no new complaints baby moving    Vitals:  Vitals:  Patient Vitals for the past 24 hrs:   BP Temp Temp src Pulse Resp SpO2   04/15/25 1655 (!) 140/86 98.4 °F (36.9 °C) Oral 80 18 99 %   04/15/25 1401 136/87 -- -- 77 -- --   04/15/25 1254 137/81 -- -- 83 -- --   04/15/25 1253 137/81 -- -- 83 -- --   04/15/25 1008 139/72 -- -- 88 -- --   04/15/25 0825 (!) 142/83 -- -- 88 -- --   04/15/25 0817 (!) 142/83 98.2 °F (36.8 °C) Oral 80 18 98 %   04/15/25 0305 (!) 140/84 98.3 °F (36.8 °C) Oral 82 18 98 %   25 2114 (!) 143/83 -- -- 76 -- --   25 (!) 170/83 -- -- 71 -- --   25 1922 (!) 140/84 98.3 °F (36.8 °C) Oral 79 16 99 %     Temp (24hrs), Av.3 °F (36.8 °C), Min:98.2 °F (36.8 °C), Max:98.4 °F (36.9 °C)      Last 24hr Input/Output:  No intake or output data in the 24 hours ending 04/15/25 1718     Non stress test:  Reactive    Uterine Activity: None     Exam:  Patient without distress.     Abdomen, fundus soft non-tender     Extremities, no redness or tenderness                   Labs:     Lab Results   Component Value Date/Time    WBC 10.1 2025 09:16 AM    WBC 10.4 2025 05:53 AM    WBC 12.3 2025 06:00 PM    WBC 11.1 2025 12:00 AM    WBC 11.4 2024 08:38 PM    WBC 10.1 2024 03:16 PM    WBC 9.3 2024 09:46 AM    HGB 11.7 2025 09:16 AM    HGB 11.6 2025 05:53 AM    HGB 12.2 2025 06:00 PM    HGB 12.0 2025 12:00 AM    HGB 12.1 2024 08:38 PM    HGB 12.6 2024 03:16 PM    HGB 13.8 2024 09:46 AM    HCT 34.4 2025 09:16 AM    HCT 35.2 2025 05:53 AM    HCT 36.3 2025 06:00 PM    HCT 36.7 2025 12:00 AM    HCT 35.4 2024 08:38 PM    HCT 37.9 2024 03:16 PM    HCT 40.3 2024 09:46 AM     2025 09:16 AM     2025 05:53 AM     2025 06:00 PM     2025 12:00 AM    PLT 
Ante Partum Progress Note    Loni Jain  34w6d        Patient states she has no new complaints Baby moving. Not sina. No headaches.     Severe range BP treated with nifedipine 10mg x 1    Vitals:  Vitals:  Patient Vitals for the past 24 hrs:   BP Temp Temp src Pulse Resp SpO2   04/16/25 1447 -- -- -- -- -- 99 %   04/16/25 1442 -- -- -- -- -- 98 %   04/16/25 1441 138/84 -- -- 80 -- --   04/16/25 1437 -- -- -- 78 -- 99 %   04/16/25 1432 -- -- -- 81 -- 98 %   04/16/25 1427 -- -- -- 80 -- 96 %   04/16/25 1421 (!) 145/95 -- -- 80 -- --   04/16/25 1419 -- -- -- 79 -- 99 %   04/16/25 1417 -- -- -- (!) 107 -- 93 %   04/16/25 1414 -- -- -- 83 -- 98 %   04/16/25 1409 -- -- -- 84 -- 98 %   04/16/25 1404 -- -- -- 79 -- 97 %   04/16/25 1401 (!) 138/91 -- -- 76 -- --   04/16/25 1359 -- -- -- 79 -- 98 %   04/16/25 1354 -- -- -- 81 -- 97 %   04/16/25 1349 -- -- -- 81 -- 99 %   04/16/25 1344 -- -- -- 86 -- 98 %   04/16/25 1341 (!) 158/96 -- -- 80 -- 93 %   04/16/25 1339 -- -- -- 86 -- 98 %   04/16/25 1334 -- -- -- 80 -- 98 %   04/16/25 1329 -- -- -- 84 -- 97 %   04/16/25 1324 -- -- -- 83 -- 98 %   04/16/25 1319 (!) 160/97 -- -- 84 -- 99 %   04/16/25 1315 (!) 160/97 -- -- 84 -- 93 %   04/16/25 1314 -- -- -- 87 -- 94 %   04/16/25 1312 (!) 164/96 -- -- 83 -- --   04/16/25 1309 -- -- -- 88 -- 98 %   04/16/25 1304 -- -- -- 85 -- 99 %   04/16/25 1300 (!) 161/90 -- -- 82 -- --   04/16/25 1259 -- -- -- 84 -- 98 %   04/16/25 0814 -- -- -- 75 -- 98 %   04/16/25 0811 -- -- -- 80 -- 94 %   04/16/25 0809 -- -- -- 81 -- 98 %   04/16/25 0804 -- -- -- 78 -- 98 %   04/16/25 0759 -- -- -- 74 -- 100 %   04/16/25 0754 -- -- -- 74 -- 100 %   04/16/25 0749 -- -- -- 70 -- 99 %   04/16/25 0744 -- -- -- 72 -- 99 %   04/16/25 0739 -- -- -- 72 -- 99 %   04/16/25 0734 -- -- -- 74 -- 99 %   04/16/25 0729 -- -- -- 77 -- 99 %   04/16/25 0727 (!) 145/94 -- -- 73 -- --   04/16/25 0607 (!) 142/90 98 °F (36.7 °C) Oral 75 16 --   04/15/25 2328 117/71 
Ante Partum Progress Note    Loni Jain  35w0d        Patient states she does not have headache  Baby moving    Vitals:  Vitals:  Patient Vitals for the past 24 hrs:   BP Temp Temp src Pulse Resp SpO2   25 1500 (!) 143/89 98.4 °F (36.9 °C) -- 86 16 98 %   25 0910 (!) 153/85 -- -- 78 -- --   25 0907 (!) 156/89 -- -- 77 -- --   25 0722 (!) 143/81 98.1 °F (36.7 °C) Oral 74 16 98 %   25 0417 127/76 -- -- 82 -- --   25 2322 120/73 98 °F (36.7 °C) Oral 82 16 94 %   25 2321 -- -- -- 85 -- 97 %   25 2122 136/79 -- -- 75 -- --   25 1939 (!) 141/87 98.5 °F (36.9 °C) Oral 79 18 98 %     Temp (24hrs), Av.3 °F (36.8 °C), Min:98 °F (36.7 °C), Max:98.5 °F (36.9 °C)      Last 24hr Input/Output:  No intake or output data in the 24 hours ending 25 1744     Non stress test:  Reactive    Uterine Activity: None     Exam:  Patient without distress.     Abdomen, fundus soft non-tender     Extremities, no redness or tenderness                   Labs:     Lab Results   Component Value Date/Time    WBC 12.9 2025 04:22 AM    WBC 8.7 2025 06:09 AM    WBC 10.1 2025 09:16 AM    WBC 10.4 2025 05:53 AM    WBC 12.3 2025 06:00 PM    WBC 11.1 2025 12:00 AM    WBC 11.4 2024 08:38 PM    WBC 10.1 2024 03:16 PM    WBC 9.3 2024 09:46 AM    HGB 11.5 2025 04:22 AM    HGB 11.4 2025 06:09 AM    HGB 11.7 2025 09:16 AM    HGB 11.6 2025 05:53 AM    HGB 12.2 2025 06:00 PM    HGB 12.0 2025 12:00 AM    HGB 12.1 2024 08:38 PM    HGB 12.6 2024 03:16 PM    HGB 13.8 2024 09:46 AM    HCT 33.8 2025 04:22 AM    HCT 33.9 2025 06:09 AM    HCT 34.4 2025 09:16 AM    HCT 35.2 2025 05:53 AM    HCT 36.3 2025 06:00 PM    HCT 36.7 2025 12:00 AM    HCT 35.4 2024 08:38 PM    HCT 37.9 2024 03:16 PM    HCT 40.3 2024 09:46 AM     2025 04:22 AM 
Baby tracing high near fundus, unable to tell position by leopolds, reported off to oncoming nurse TASHIA Mejía who will call the MD before beginning pitocin.   
Baby tracing high near fundus, unable to tell position by leopolds, reported off to oncoming nurse TASHIA Mejía who will call the MD before beginning pitocin.    [id number] made aware, en route to bedside   
Labor Progress Note  Patient seen, fetal heart rate and contraction pattern evaluated, patient examined.  BP (!) 115/56 Comment: holding am medication  Pulse 96   Temp 98.8 °F (37.1 °C) (Oral)   Resp 18   LMP 07/28/2024 (Exact Date)   SpO2 97%     Physical Exam:  Cervical Exam:  8 cm dilated    100% effaced    -2 station    Presenting Part: cephalic  Membranes:  Artificial Rupture of Membranes; Amniotic Fluid: large amount of clear fluid  Uterine Activity: Frequency: Every 3 minutes  Fetal Heart Rate: Variability: moderate  Accelerations: yes  Decelerations: variable    Assessment/Plan:  FSE replaced  Position change  Monitor closely - if variables become repetitive consider AI            
MATERNAL FETAL MEDICINE  INPATIENT CONSULTATION    REQUESTED BY: Eveline Wilkerson MD   DATE OF CONSULT: 25    REASON FOR CONSULTATION: CHTN, exacerbation, rule out severe pre-eclampsia     Loni Jain is a 24 y.o. female  at 34w6d.    HPI  The patient is seen back for repeat evaluation as she had sustained hypertension today noted in the early afternoon, with blood pressures of 160/97 approximately 5 minutes apart times two.  Her blood pressure otherwise this afternoon has been 138/91, 145/95, and 138/84 (she was given Procardia 10 mg to bring her BP down).  She is currently taking labetalol 300 mg twice daily.  Preeclampsia labs were repeated this morning and they were notable for PC ratio of 0. 2   The patient does note that she has a persistent frontal headache at this time.  She otherwise denies right upper quadrant epigastric    Patient Active Problem List   Diagnosis    Supervision of high risk pregnancy, antepartum    Hypertension affecting pregnancy, antepartum    Hypertension affecting pregnancy, antepartum, unspecified trimester       Past Medical History:   Diagnosis Date    Papanicolaou smear for cervical cancer screening 10/29/2024    Normal       Past Surgical History:   Procedure Laterality Date    WISDOM TOOTH EXTRACTION         OB History    Para Term  AB Living   2    1    SAB IAB Ectopic Molar Multiple Live Births   1           # Outcome Date GA Lbr Pollo/2nd Weight Sex Type Anes PTL Lv   2 Current            1 SAB 2023 6w0d              Allergies   Allergen Reactions    Citrus Hives    Molds & Smuts          Current Facility-Administered Medications:     NIFEdipine (PROCARDIA) immediate release capsule 10 mg, 10 mg, Oral, NOW, Eveline Wilkerson MD    betamethasone acetate-betamethasone sodium phosphate (CELESTONE) injection 12 mg, 12 mg, IntraMUSCular, Q24H, Eveline Wilkerson MD    labetalol (NORMODYNE) tablet 300 mg, 300 mg, Oral, 2 times per day, Eveline Wilkerson MD, 300 
Patient Vitals for the past 24 hrs:   BP Temp Temp src Pulse Resp SpO2   04/17/25 2049 (!) 151/84 -- -- 83 -- --   04/17/25 2025 -- -- -- 78 -- 95 %   04/17/25 2020 -- -- -- 75 -- 97 %   04/17/25 2015 -- -- -- -- -- 97 %   04/17/25 2014 (!) 169/86 -- -- 82 -- --   04/17/25 2010 -- -- -- 76 -- 97 %   04/17/25 2005 -- -- -- 76 -- 96 %   04/17/25 2000 -- -- -- -- -- 95 %   04/17/25 1959 (!) 162/82 -- -- 81 -- 93 %   04/17/25 1955 -- -- -- 82 -- 95 %   04/17/25 1950 -- -- -- 76 -- 94 %   04/17/25 1949 -- -- -- 75 -- 94 %   04/17/25 1945 -- -- -- 78 -- 95 %   04/17/25 1941 -- -- -- 76 -- --   04/17/25 1940 -- -- -- 75 -- 95 %   04/17/25 1936 -- -- -- 75 -- 94 %   04/17/25 1935 -- -- -- 74 -- 95 %   04/17/25 1930 -- -- -- 77 -- 94 %   04/17/25 1925 -- -- -- 75 -- 94 %   04/17/25 1922 -- -- -- 78 -- 94 %   04/17/25 1920 -- -- -- 78 -- 95 %   04/17/25 1915 -- -- -- 77 -- --   04/17/25 1914 -- -- -- 74 -- 94 %   04/17/25 1910 -- -- -- 75 -- 95 %   04/17/25 1905 -- -- -- 81 -- 96 %   04/17/25 1900 -- -- -- 79 -- 97 %   04/17/25 1851 (!) 146/91 -- -- 78 -- --   04/17/25 1500 (!) 143/89 98.4 °F (36.9 °C) -- 86 16 98 %   04/17/25 0910 (!) 153/85 -- -- 78 -- --   04/17/25 0907 (!) 156/89 -- -- 77 -- --   04/17/25 0722 (!) 143/81 98.1 °F (36.7 °C) Oral 74 16 98 %   04/17/25 0417 127/76 -- -- 82 -- --   04/16/25 2322 120/73 98 °F (36.7 °C) Oral 82 16 94 %   04/16/25 2321 -- -- -- 85 -- 97 %     Labetalol 300 mg po q8h  She received one dose nifedipine IR  Will continue nifedipine xl 30 mg po q12h    Recent Labs     04/16/25  0609 04/17/25  0422 04/17/25  0513   WBC 8.7 12.9*  --    HGB 11.4* 11.5  --     240  --     135*  --    K 4.0 4.6  --    * 106  --    CO2 21 21  --    BUN 9 15  --    CREATININE 0.64 0.81  --    GLUCOSE 91 132*  --    ALT 17 18  --    AST 13* 12*  --    PROCRERATURR 0.2  --  0.2     Will repeat labd I AM to follow creatinine  
Post-Partum Progress Note    Information for the patient's :  Cristobal, Female Loni Ivey [[id number]]   Vaginal, Spontaneous     Patient doing well without significant complaint.  Voiding without difficulty, normal lochia. Has been able to visit her baby in NICU this morning. Reports dizziness with standing that is worsening.     Vitals:  /72   Pulse 85   Temp 98.4 °F (36.9 °C)   Resp 16   LMP 2024 (Exact Date)   SpO2 99%   Breastfeeding Unknown   Temp (24hrs), Av.4 °F (36.9 °C), Min:97.9 °F (36.6 °C), Max:99 °F (37.2 °C)      Exam:     Patient without distress.  Abdomen soft, fundus firm, non-tender  Lower extremities are negative for swelling, cords or tenderness.      Assessment: 25 yo  PPD2 s/p  complicated by PPH s/p Pitocin, TXA, misoprostol, and SUSAN device. QBL 1100 cc. Due to reports of dizziness will recheck CBC. Hgb of 8.5 yesterday.  History of chronic HTN. BP have been low-normal.     Plan:  1. Continue routine postpartum and perineal care as well as maternal education.  2. Continue labetalol 300 mg TID. Given low-normal BP will discontinue Nifedipine XL.   3. Check CBC    Mamta Yin MD         
Post-Partum Progress Note    Information for the patient's :  Cristobal, Female Loni Ivey [[id number]]   Vaginal, Spontaneous     Patient doing well without significant complaint.  Voiding without difficulty, normal lochia. Has been able to visit her baby in NICU this morning.    Vitals:  /78   Pulse 90   Temp 99 °F (37.2 °C) (Oral)   Resp 16   LMP 2024 (Exact Date)   SpO2 99%   Breastfeeding Unknown   Temp (24hrs), Av.7 °F (37.1 °C), Min:98.1 °F (36.7 °C), Max:99 °F (37.2 °C)      Exam:     Patient without distress.  Abdomen soft, fundus firm, non-tender  Lower extremities are negative for swelling, cords or tenderness.    Labs:     Recent Results (from the past 24 hours)   CBC with Auto Differential    Collection Time: 25  4:53 PM   Result Value Ref Range    WBC 16.2 (H) 3.6 - 11.0 K/uL    RBC 3.66 (L) 3.80 - 5.20 M/uL    Hemoglobin 10.6 (L) 11.5 - 16.0 g/dL    Hematocrit 31.7 (L) 35.0 - 47.0 %    MCV 86.6 80.0 - 99.0 FL    MCH 29.0 26.0 - 34.0 PG    MCHC 33.4 30.0 - 36.5 g/dL    RDW 13.5 11.5 - 14.5 %    Platelets 241 150 - 400 K/uL    MPV 10.2 8.9 - 12.9 FL    Nucleated RBCs 0.2 (H) 0  WBC    nRBC 0.04 (H) 0.00 - 0.01 K/uL    Neutrophils % 75 32 - 75 %    Band Neutrophils 2 0 - 6 %    Lymphocytes % 12 12 - 49 %    Monocytes % 8 5 - 13 %    Eosinophils % 2 0 - 7 %    Basophils % 1 0 - 1 %    Immature Granulocytes % 0 %    Neutrophils Absolute 12.48 (H) 1.8 - 8.0 K/UL    Lymphocytes Absolute 1.94 0.8 - 3.5 K/UL    Monocytes Absolute 1.30 (H) 0.0 - 1.0 K/UL    Eosinophils Absolute 0.32 0.0 - 0.4 K/UL    Basophils Absolute 0.16 (H) 0.0 - 0.1 K/UL    Immature Granulocytes Absolute 0.00 K/UL    Differential Type MANUAL      RBC Comment NORMOCYTIC, NORMOCHROMIC     Protime-INR    Collection Time: 25  4:53 PM   Result Value Ref Range    INR 0.9 0.9 - 1.1      Protime 10.0 9.2 - 11.2 sec   APTT    Collection Time: 25  4:53 PM   Result Value Ref Range    APTT 
S The patient is about 2 hours postpartum hemorrhage controlled with a SUSAN. The SUSAN appeared to have 150cc outout for the first 1 1/2 hours according to the nurse who was concerned. One more hour of observation revealed much reduced output of only 50cc in last hour. The SUSAN had been accidentally disconnected for several minutes but then was reconnected during the initial hour post-placement. The patient has no complaints of palpitations, dizziness, or SOB.    O VS-stable     Abdomen- uterus firm and 2 fingers below umbilicus.    Vagina- no active bleeding around cervical seal and no clots.    A S/P postpartum hemorrhage-improving.    P 1. I told nurse that if output of blood in SUSAN exceeds 50cc in next hour will then deflate cervical seal and check that SUSAN has proper placement. 2. I talked with DR. Decker who placed SUSAN and he agrees with that plan.  
The nurses are concerned the fetus may be breech.  US confirms a cephalic presentation  
u -1.  VSS  No active bleeding coming from brittani.  2000 Late entry. No active bleeding from vagina not brittani as documented at 1826.    1915 Fundal exam performed with Brinda Pickens.  Fundus firm at u. No active bleeding from vagina.  200cc blood in canister.

## 2025-04-20 NOTE — DISCHARGE INSTRUCTIONS
Patient Discharge Instructions    Loni Jain / [id number] : 2000    Admitted 2025 Discharged: 2025       Please bring this form with you to show your care provider at your follow-up appointment.    Discharging provider:  Mamta Yin MD  - Family Medicine/OB Attending     ACUTE DIAGNOSES:  Hypertension affecting pregnancy, antepartum, unspecified trimester [O16.9]  Hypertension complicating pregnancy, third trimester [O16.3]  FOLLOW-UP CARE RECOMMENDATIONS:    Continuing Therapy:  - Please continue Motrin 800 mg, 1 tablet every 8 hours for the next 2 days, then 1 tablet every 8 hours as needed for pain  - Please take Ferrous Sulfate 325 mg for anemia, Take 1 tablet every other day with Orange juice  - Please continue your prenatal vitamins    Please check your blood pressure daily and record the values to bring to your next visit. Please call or seek immediate treatment if you blood pressure is higher than 160/110.    Specific symptoms to watch for: headache, vision changes, chest pain, shortness of breath, fever, chills, nausea, vomiting, diarrhea, change in mentation, falling, weakness, bleeding.     DIET/what to eat:  Regular Diet    ACTIVITY:   Activity Instructions    Do not lift anything heavier than your baby for 6 weeks. Nothing in the vagina for 6 weeks. After having a /vaginal delivery you will still need to wait about six weeks before having sex. You will have your six-week postpartum check-up at this time.You are ok to drive as long as you are not taking Percocet or other narcotic pain medication (Motrin is ok).       Wound care:  fill the ron bottle with warm water and squeeze it: a jet of water will shoot out the nozzle to help you cleanse and wash your perineal area.      I understand that if any problems occur once I am at home I am to contact my physician.

## 2025-04-21 ENCOUNTER — LACTATION ENCOUNTER (OUTPATIENT)
Facility: HOSPITAL | Age: 25
End: 2025-04-21

## 2025-04-21 LAB — T PALLIDUM AB SER QL IA: NON REACTIVE

## 2025-04-21 NOTE — LACTATION NOTE
This note was copied from a baby's chart.  SHAYAN requested for assistance in putting baby to breast for the first time in NICU. LC demonstrated football hold and cross cradle positions. Assisted mother into cross cradle position and baby was able to latch on with lips flanged out and sustained rhythmic sucking starting at 1511 for about 15 minutes.  SHAYAN reviewed pumping at home, mother states she is waiting for her home pump to arrive. Encouraged to continue pumping every 2-3 hours to establish and maintain milk supply. 18 mm sized medela flanges provided. All questions answered at this time.    Infant admitted to NICU.  Pt will successfully establish breast milk supply by pumping with a hospital grade pump every 2-3 hours for approximately 20 minutes/8-10 x day.  To maximize milk production mom taught to incorporate breast massage before and hand expression after each pumping session.  All expressed breast milk (EBM) will be provided for infant(s) use.  The value of skin to skin bonding emphasized.  The breast will be offered as baby is ready; with the goal of eventual transition to breastfeeding. Importance of maintaining milk supply through pumping emphasized as infant(s) learns to nurse.      Pt will successfully establish breastfeeding by feeding in response to early feeding cues   or wake every 3h, will obtain deep latch, and will keep log of feedings/output.  Taught to BF at hunger cues and or q 2-3 hrs and to offer 10-20 drops of hand expressed colostrum at any non-feeds.      Left Breast: Soft, Filling  Left Nipple: Protrude  Right Nipple: Protrude  Right Breast: Soft, Filling  Position and Latch: With assistance, Provides breast support  Signs of Transfer: Uterine cramping, Nutritive sucking  Maternal Response:  Comfortable with position        Latch: Repeated attempts, hold nipple in mouth, stimulate to suck  Audible Swallowing: A few with stimulation  Type of Nipple: Everted (after stimulation)  Comfort

## 2025-04-22 ENCOUNTER — TELEPHONE (OUTPATIENT)
Age: 25
End: 2025-04-22

## 2025-04-22 NOTE — TELEPHONE ENCOUNTER
PT name and  verified    23 yo last ov 25,  25      PT calling, as saying she was already speaking with someone () and was on hold, as they said they were going to ask  when PT needs to be seen.  RN informed PT that she was transferred to the nurse triage line.  PT reports she delivered  and hemorrhaged, and was told she would get a call to schedule her fu appt with , and no one has set that up. PT reports she had SUSAN when she hemorrhaged.   PT also reports she was concerned because she went to the bathroom this am, and she felt like a balloon was in her vagina, had some abdominal cramping and felt like she needed to push. When she pushed \"a large clot\" came out. RN asked what size, and PT states she cannot really say a size, she could not tell.   RN asked how her bleeding is now afterwards, and PT states it is \"normal\" and she does not have any abdominal cramping anymore.  RN relayed that sometimes there is residual old blood that has clotted off and that she would just monitor her bleeding. Bleeding precautions reviewed-golf ball/egg sized clots are not normal, and saturating a pad within an hour or less, is not normal and to report these and or go to the ER.  RN informed PT would send the message to  and see if she wanted her to be seen sooner, she will need to schedule her PP appt, also.  PT verbalizes understanding and states she would be in the NICU all week and to just let her know what  recommends.  RN discussed call with KK/TH and she consulted  and  states she just needs to schedule a regular PP ov for 6 weeks, does not need to be seen sooner, monitor bleeding.  PT was called back, name and  verified  RN relayed MD's advice, PT verbalizes understanding and was scheduled for 6 wk PP fu for 25 at 900.

## 2025-04-23 ENCOUNTER — APPOINTMENT (OUTPATIENT)
Facility: HOSPITAL | Age: 25
End: 2025-04-23
Payer: COMMERCIAL

## 2025-04-23 ENCOUNTER — HOSPITAL ENCOUNTER (EMERGENCY)
Facility: HOSPITAL | Age: 25
Discharge: HOME | End: 2025-04-23
Attending: STUDENT IN AN ORGANIZED HEALTH CARE EDUCATION/TRAINING PROGRAM
Payer: COMMERCIAL

## 2025-04-23 VITALS
HEIGHT: 65 IN | DIASTOLIC BLOOD PRESSURE: 77 MMHG | HEART RATE: 89 BPM | BODY MASS INDEX: 37.25 KG/M2 | TEMPERATURE: 98.6 F | WEIGHT: 223.55 LBS | SYSTOLIC BLOOD PRESSURE: 133 MMHG | OXYGEN SATURATION: 100 % | RESPIRATION RATE: 21 BRPM

## 2025-04-23 DIAGNOSIS — R07.89 CHEST WALL PAIN: Primary | ICD-10-CM

## 2025-04-23 LAB
ALBUMIN SERPL-MCNC: 3 G/DL (ref 3.5–5)
ALBUMIN/GLOB SERPL: 0.8 (ref 1.1–2.2)
ALP SERPL-CCNC: 89 U/L (ref 45–117)
ALT SERPL-CCNC: 28 U/L (ref 12–78)
ANION GAP SERPL CALC-SCNC: 5 MMOL/L (ref 2–12)
AST SERPL-CCNC: 23 U/L (ref 15–37)
BASOPHILS # BLD: 0.03 K/UL (ref 0–0.1)
BASOPHILS NFR BLD: 0.3 % (ref 0–1)
BILIRUB SERPL-MCNC: 0.4 MG/DL (ref 0.2–1)
BUN SERPL-MCNC: 12 MG/DL (ref 6–20)
BUN/CREAT SERPL: 15 (ref 12–20)
CALCIUM SERPL-MCNC: 9.4 MG/DL (ref 8.5–10.1)
CHLORIDE SERPL-SCNC: 107 MMOL/L (ref 97–108)
CO2 SERPL-SCNC: 26 MMOL/L (ref 21–32)
CREAT SERPL-MCNC: 0.79 MG/DL (ref 0.55–1.02)
D DIMER PPP FEU-MCNC: 6.81 MG/L FEU (ref 0–0.65)
DIFFERENTIAL METHOD BLD: ABNORMAL
EOSINOPHIL # BLD: 0.19 K/UL (ref 0–0.4)
EOSINOPHIL NFR BLD: 2.2 % (ref 0–7)
ERYTHROCYTE [DISTWIDTH] IN BLOOD BY AUTOMATED COUNT: 13.9 % (ref 11.5–14.5)
GLOBULIN SER CALC-MCNC: 3.6 G/DL (ref 2–4)
GLUCOSE SERPL-MCNC: 115 MG/DL (ref 65–100)
HCT VFR BLD AUTO: 25.1 % (ref 35–47)
HGB BLD-MCNC: 8.1 G/DL (ref 11.5–16)
IMM GRANULOCYTES # BLD AUTO: 0.1 K/UL (ref 0–0.04)
IMM GRANULOCYTES NFR BLD AUTO: 1.2 % (ref 0–0.5)
LIPASE SERPL-CCNC: 56 U/L (ref 13–75)
LYMPHOCYTES # BLD: 1.69 K/UL (ref 0.8–3.5)
LYMPHOCYTES NFR BLD: 19.7 % (ref 12–49)
MCH RBC QN AUTO: 29 PG (ref 26–34)
MCHC RBC AUTO-ENTMCNC: 32.3 G/DL (ref 30–36.5)
MCV RBC AUTO: 90 FL (ref 80–99)
MONOCYTES # BLD: 0.48 K/UL (ref 0–1)
MONOCYTES NFR BLD: 5.6 % (ref 5–13)
NEUTS SEG # BLD: 6.11 K/UL (ref 1.8–8)
NEUTS SEG NFR BLD: 71 % (ref 32–75)
NRBC # BLD: 0.03 K/UL (ref 0–0.01)
NRBC BLD-RTO: 0.3 PER 100 WBC
NT PRO BNP: 69 PG/ML
PLATELET # BLD AUTO: 325 K/UL (ref 150–400)
PMV BLD AUTO: 9.6 FL (ref 8.9–12.9)
POTASSIUM SERPL-SCNC: 3.9 MMOL/L (ref 3.5–5.1)
PROT SERPL-MCNC: 6.6 G/DL (ref 6.4–8.2)
RBC # BLD AUTO: 2.79 M/UL (ref 3.8–5.2)
SODIUM SERPL-SCNC: 138 MMOL/L (ref 136–145)
TROPONIN I SERPL HS-MCNC: 6 NG/L (ref 0–51)
TROPONIN I SERPL HS-MCNC: 6 NG/L (ref 0–51)
WBC # BLD AUTO: 8.6 K/UL (ref 3.6–11)

## 2025-04-23 PROCEDURE — 36415 COLL VENOUS BLD VENIPUNCTURE: CPT

## 2025-04-23 PROCEDURE — 99285 EMERGENCY DEPT VISIT HI MDM: CPT

## 2025-04-23 PROCEDURE — 80053 COMPREHEN METABOLIC PANEL: CPT

## 2025-04-23 PROCEDURE — 6360000004 HC RX CONTRAST MEDICATION: Performed by: STUDENT IN AN ORGANIZED HEALTH CARE EDUCATION/TRAINING PROGRAM

## 2025-04-23 PROCEDURE — 85379 FIBRIN DEGRADATION QUANT: CPT

## 2025-04-23 PROCEDURE — 85025 COMPLETE CBC W/AUTO DIFF WBC: CPT

## 2025-04-23 PROCEDURE — 84484 ASSAY OF TROPONIN QUANT: CPT

## 2025-04-23 PROCEDURE — 71275 CT ANGIOGRAPHY CHEST: CPT

## 2025-04-23 PROCEDURE — 83690 ASSAY OF LIPASE: CPT

## 2025-04-23 PROCEDURE — 83880 ASSAY OF NATRIURETIC PEPTIDE: CPT

## 2025-04-23 PROCEDURE — 71045 X-RAY EXAM CHEST 1 VIEW: CPT

## 2025-04-23 PROCEDURE — 76705 ECHO EXAM OF ABDOMEN: CPT

## 2025-04-23 RX ORDER — IOPAMIDOL 755 MG/ML
100 INJECTION, SOLUTION INTRAVASCULAR
Status: COMPLETED | OUTPATIENT
Start: 2025-04-23 | End: 2025-04-23

## 2025-04-23 RX ADMIN — IOPAMIDOL 75 ML: 755 INJECTION, SOLUTION INTRAVENOUS at 20:43

## 2025-04-23 ASSESSMENT — PAIN DESCRIPTION - ORIENTATION: ORIENTATION: RIGHT

## 2025-04-23 ASSESSMENT — PAIN DESCRIPTION - LOCATION: LOCATION: CHEST;RIB CAGE

## 2025-04-23 ASSESSMENT — PAIN SCALES - GENERAL
PAINLEVEL_OUTOF10: 0
PAINLEVEL_OUTOF10: 4

## 2025-04-23 ASSESSMENT — PAIN DESCRIPTION - DESCRIPTORS: DESCRIPTORS: STABBING

## 2025-04-23 ASSESSMENT — PAIN - FUNCTIONAL ASSESSMENT: PAIN_FUNCTIONAL_ASSESSMENT: 0-10

## 2025-04-23 NOTE — ED TRIAGE NOTES
Patient arrives POV with c/o chest pain and rib pain under right breast that started approx 30 min ago.    Patient endorses shortness of breath    Patient reports recent birth on Friday at 35 weeks d/t high blood pressures and placenta previa.     Patient denies n/v. Endorses diarrhea that started today.    Patient reports pain 4/10 at this time

## 2025-04-24 NOTE — ED PROVIDER NOTES
Labs otherwise show stable hemoglobin. RUQ US shows hepatic steatosis and cholelithiasis, no acute cholecystitis. Imaging otherwise unremarkable.     On re-evaluation, patient is still without recurrent symptoms and feels well. BP reassuring, no preeclampsia. She is requesting discharge. Low suspicion for more sinister process given benign exam, reassuring workup, and resolved symptoms. Will have them follow up, close return precautions discussed, they are agreeable.     Diagnosis is chest wall pain. Disposition is Discharge with PCP follow-up. Workup and plan discussed with patient  and family , all questions answered and they are in agreement with the  plan .             ED Course as of 04/24/25 0102   Wed Apr 23, 2025 2006 EKG rate 87bpm, sinus rhythm, no STEMI [MG]      ED Course User Index  [MG] Marla Becerra DO         Total critical care time (not including time spent performing separately reportable procedures):         Review of external notes and Independent historians utilized in decision making: External notes reviewed includeOutside medication list     Diagnostics independently interpreted by me: EKG see ED course for interpretation    Discussions with other clinicians and healthcare agents:  None    Risks considered in patient's treatment plan: N/A        HISTORY OF PRESENT ILLNESS   (Location/Symptom, Timing/Onset, Context/Setting, Quality, Duration, Modifying Factors, Severity)  Note limiting factors.   See MDM    Nursing Notes were reviewed.    REVIEW OF SYSTEMS    (2-9 systems for level 4, 10 or more for level 5)   See MDM    PAST MEDICAL HISTORY     Past Medical History:   Diagnosis Date    Papanicolaou smear for cervical cancer screening 10/29/2024    Normal       SURGICAL HISTORY       Past Surgical History:   Procedure Laterality Date    WISDOM TOOTH EXTRACTION  2021       CURRENT MEDICATIONS       Discharge Medication List as of 4/23/2025 11:25 PM        CONTINUE these medications

## 2025-04-26 ASSESSMENT — HEART SCORE: ECG: NORMAL

## 2025-05-02 ENCOUNTER — APPOINTMENT (OUTPATIENT)
Facility: HOSPITAL | Age: 25
End: 2025-05-02
Payer: COMMERCIAL

## 2025-05-02 ENCOUNTER — HOSPITAL ENCOUNTER (EMERGENCY)
Facility: HOSPITAL | Age: 25
Discharge: HOME | End: 2025-05-02
Attending: EMERGENCY MEDICINE
Payer: COMMERCIAL

## 2025-05-02 VITALS
OXYGEN SATURATION: 96 % | HEART RATE: 79 BPM | SYSTOLIC BLOOD PRESSURE: 142 MMHG | TEMPERATURE: 98 F | WEIGHT: 223.55 LBS | DIASTOLIC BLOOD PRESSURE: 91 MMHG | RESPIRATION RATE: 16 BRPM | BODY MASS INDEX: 37.25 KG/M2 | HEIGHT: 65 IN

## 2025-05-02 DIAGNOSIS — K80.20 SYMPTOMATIC CHOLELITHIASIS: Primary | ICD-10-CM

## 2025-05-02 LAB
ALBUMIN SERPL-MCNC: 3.5 G/DL (ref 3.5–5)
ALBUMIN/GLOB SERPL: 0.8 (ref 1.1–2.2)
ALP SERPL-CCNC: 99 U/L (ref 45–117)
ALT SERPL-CCNC: 32 U/L (ref 12–78)
ANION GAP SERPL CALC-SCNC: 7 MMOL/L (ref 2–12)
APPEARANCE UR: ABNORMAL
AST SERPL-CCNC: 42 U/L (ref 15–37)
BACTERIA URNS QL MICRO: NEGATIVE /HPF
BASOPHILS # BLD: 0.05 K/UL (ref 0–0.1)
BASOPHILS NFR BLD: 0.7 % (ref 0–1)
BILIRUB DIRECT SERPL-MCNC: 0.1 MG/DL (ref 0–0.2)
BILIRUB SERPL-MCNC: 0.5 MG/DL (ref 0.2–1)
BILIRUB UR QL: NEGATIVE
BUN SERPL-MCNC: 12 MG/DL (ref 6–20)
BUN/CREAT SERPL: 12 (ref 12–20)
CALCIUM SERPL-MCNC: 9.6 MG/DL (ref 8.5–10.1)
CHLORIDE SERPL-SCNC: 105 MMOL/L (ref 97–108)
CO2 SERPL-SCNC: 26 MMOL/L (ref 21–32)
COLOR UR: ABNORMAL
CREAT SERPL-MCNC: 1.01 MG/DL (ref 0.55–1.02)
DIFFERENTIAL METHOD BLD: ABNORMAL
EOSINOPHIL # BLD: 0.3 K/UL (ref 0–0.4)
EOSINOPHIL NFR BLD: 3.9 % (ref 0–7)
EPITH CASTS URNS QL MICRO: ABNORMAL /LPF
ERYTHROCYTE [DISTWIDTH] IN BLOOD BY AUTOMATED COUNT: 13.7 % (ref 11.5–14.5)
ETHANOL SERPL-MCNC: <10 MG/DL (ref 0–0.08)
GLOBULIN SER CALC-MCNC: 4.2 G/DL (ref 2–4)
GLUCOSE SERPL-MCNC: 92 MG/DL (ref 65–100)
GLUCOSE UR STRIP.AUTO-MCNC: NEGATIVE MG/DL
HCG SERPL-ACNC: 5 MIU/ML (ref 0–6)
HCG UR QL: NEGATIVE
HCT VFR BLD AUTO: 28.2 % (ref 35–47)
HGB BLD-MCNC: 9.1 G/DL (ref 11.5–16)
HGB UR QL STRIP: ABNORMAL
HYALINE CASTS URNS QL MICRO: ABNORMAL /LPF (ref 0–2)
IMM GRANULOCYTES # BLD AUTO: 0.04 K/UL (ref 0–0.04)
IMM GRANULOCYTES NFR BLD AUTO: 0.5 % (ref 0–0.5)
KETONES UR QL STRIP.AUTO: NEGATIVE MG/DL
LEUKOCYTE ESTERASE UR QL STRIP.AUTO: ABNORMAL
LIPASE SERPL-CCNC: 29 U/L (ref 13–75)
LYMPHOCYTES # BLD: 2.38 K/UL (ref 0.8–3.5)
LYMPHOCYTES NFR BLD: 30.9 % (ref 12–49)
MCH RBC QN AUTO: 28 PG (ref 26–34)
MCHC RBC AUTO-ENTMCNC: 32.3 G/DL (ref 30–36.5)
MCV RBC AUTO: 86.8 FL (ref 80–99)
MONOCYTES # BLD: 0.45 K/UL (ref 0–1)
MONOCYTES NFR BLD: 5.9 % (ref 5–13)
NEUTS SEG # BLD: 4.48 K/UL (ref 1.8–8)
NEUTS SEG NFR BLD: 58.1 % (ref 32–75)
NITRITE UR QL STRIP.AUTO: NEGATIVE
NRBC # BLD: 0 K/UL (ref 0–0.01)
NRBC BLD-RTO: 0 PER 100 WBC
PH UR STRIP: 5.5 (ref 5–8)
PLATELET # BLD AUTO: 376 K/UL (ref 150–400)
POTASSIUM SERPL-SCNC: 4.6 MMOL/L (ref 3.5–5.1)
PROT SERPL-MCNC: 7.7 G/DL (ref 6.4–8.2)
PROT UR STRIP-MCNC: ABNORMAL MG/DL
RBC # BLD AUTO: 3.25 M/UL (ref 3.8–5.2)
RBC #/AREA URNS HPF: ABNORMAL /HPF (ref 0–5)
RBC MORPH BLD: ABNORMAL
SODIUM SERPL-SCNC: 138 MMOL/L (ref 136–145)
SP GR UR REFRACTOMETRY: 1.01 (ref 1–1.03)
URINE CULTURE IF INDICATED: ABNORMAL
UROBILINOGEN UR QL STRIP.AUTO: 0.2 EU/DL (ref 0.2–1)
WBC # BLD AUTO: 7.7 K/UL (ref 3.6–11)
WBC URNS QL MICRO: >100 /HPF (ref 0–4)

## 2025-05-02 PROCEDURE — 83690 ASSAY OF LIPASE: CPT

## 2025-05-02 PROCEDURE — 81001 URINALYSIS AUTO W/SCOPE: CPT

## 2025-05-02 PROCEDURE — 80048 BASIC METABOLIC PNL TOTAL CA: CPT

## 2025-05-02 PROCEDURE — 84702 CHORIONIC GONADOTROPIN TEST: CPT

## 2025-05-02 PROCEDURE — 87186 SC STD MICRODIL/AGAR DIL: CPT

## 2025-05-02 PROCEDURE — 99284 EMERGENCY DEPT VISIT MOD MDM: CPT

## 2025-05-02 PROCEDURE — 80076 HEPATIC FUNCTION PANEL: CPT

## 2025-05-02 PROCEDURE — 36415 COLL VENOUS BLD VENIPUNCTURE: CPT

## 2025-05-02 PROCEDURE — 87088 URINE BACTERIA CULTURE: CPT

## 2025-05-02 PROCEDURE — 76705 ECHO EXAM OF ABDOMEN: CPT

## 2025-05-02 PROCEDURE — 85025 COMPLETE CBC W/AUTO DIFF WBC: CPT

## 2025-05-02 PROCEDURE — 87086 URINE CULTURE/COLONY COUNT: CPT

## 2025-05-02 PROCEDURE — 81025 URINE PREGNANCY TEST: CPT

## 2025-05-02 PROCEDURE — 82077 ASSAY SPEC XCP UR&BREATH IA: CPT

## 2025-05-02 ASSESSMENT — PAIN DESCRIPTION - ORIENTATION: ORIENTATION: RIGHT

## 2025-05-02 ASSESSMENT — ENCOUNTER SYMPTOMS
BLOOD IN STOOL: 0
ABDOMINAL DISTENTION: 0
ABDOMINAL PAIN: 1
ANAL BLEEDING: 0
SHORTNESS OF BREATH: 0
DIARRHEA: 0
VOMITING: 0
NAUSEA: 0
COLOR CHANGE: 1
COUGH: 0

## 2025-05-02 ASSESSMENT — PAIN SCALES - GENERAL: PAINLEVEL_OUTOF10: 1

## 2025-05-02 ASSESSMENT — PAIN DESCRIPTION - LOCATION: LOCATION: ABDOMEN

## 2025-05-02 ASSESSMENT — PAIN DESCRIPTION - DESCRIPTORS: DESCRIPTORS: ACHING;DULL

## 2025-05-02 NOTE — ED TRIAGE NOTES
Pt arrives to the ED with complaints of RUQ pain, pt reports she was recently diagnosed with gallstones and has been told that she was \"looking yellow\". Pt reports she is not currently in pain but the pain comes and goes.

## 2025-05-02 NOTE — ED PROVIDER NOTES
[facility] EMERGENCY DEPARTMENT  EMERGENCY DEPARTMENT ENCOUNTER      Pt Name: Loni Jain  MRN: [id number]  Birthdate 2000  Date of evaluation: 2025  Provider: Javier Holt MD    CHIEF COMPLAINT       Chief Complaint   Patient presents with    Abdominal Pain         HISTORY OF PRESENT ILLNESS   (Location/Symptom, Timing/Onset, Context/Setting, Quality, Duration, Modifying Factors, Severity)  Note limiting factors.   24F 2wks post partum  p/w 1-2wks abd pain. Pt reports 1-2wks intermittent upper abd pain worse after eating \"unhealthy food.\" Hasn't had any pain since Monday of this week. Pt and family worried however that she might be jaundiced so came to ER after speaking w/ her GI doctor. Now has no pain. NO N/V/D, fevers, diarrhea, rectal bleeding/melena. Still having mild vaginal bleeding and discharge which is improving since her delivery but no dysuria. No previous abd surgeries.            Review of External Medical Records:     Nursing Notes were reviewed.    REVIEW OF SYSTEMS    (2-9 systems for level 4, 10 or more for level 5)     Review of Systems   Constitutional:  Negative for diaphoresis and fever.   HENT:  Negative for nosebleeds.    Eyes:  Negative for visual disturbance.   Respiratory:  Negative for cough and shortness of breath.    Cardiovascular:  Negative for chest pain, palpitations and leg swelling.   Gastrointestinal:  Positive for abdominal pain. Negative for abdominal distention, anal bleeding, blood in stool, diarrhea, nausea and vomiting.   Endocrine: Negative for polyuria.   Genitourinary:  Negative for difficulty urinating, dysuria, frequency and hematuria.   Musculoskeletal:  Negative for joint swelling.   Skin:  Positive for color change. Negative for wound.   Allergic/Immunologic: Negative for immunocompromised state.   Neurological:  Negative for seizures and syncope.   Hematological:  Does not bruise/bleed easily.   Psychiatric/Behavioral:   - 37 U/L Final    SPECIMEN HEMOLYZED, RESULTS MAY BE AFFECTED    ALT 05/02/2025 32  12 - 78 U/L Final    Ethanol Lvl 05/02/2025 <10  <10 MG/DL Final    hCG Quant 05/02/2025 5  0 - 6 MIU/ML Final    Comment:    Gestational Age hCG       mIU/mL (IU/L)     0.2-1 WEEK                  5-50  1-2 WEEKS                     2-3 WEEKS                   100-5,000  3-4 WEEKS                   500-10,000  4-5 WEEKS                   1,000-50,000  5-6 WEEKS                   10,000-100,000  6-8 WEEKS                   15,000-200,000  2-3 MONTHS                  10,000-100,000     The pregnancy tests performed at this institution are intended for use in diagnosing pregnancy only. They are not intended for use in screening for/monitoring of hCG as a tumor marker. If a hCG producing tumor is in the differential diagnoses, recommend ordering test specific for hCG as a tumor marker.     The results of hCG testing should be interpreted in conjunction with the clinical setting. Falsely elevated or decreased values may be seen in patients with human anti-mouse antibodies .  Consistently elevated hCG levels may be due to heterophilic antibodies or to nonspecific protein binding. Elevated levels of                            hCG can occur in other clinical settings such as abnormal physiologic states .  Consider requesting hCG be quantitated by an alternative method as listed above, if the hCG results do not fit the clinical setting.      Color, UA 05/02/2025 YELLOW/STRAW    Final    Color Reference Range: Straw, Yellow or Dark Yellow    Appearance 05/02/2025 CLOUDY (A)  CLEAR   Final    Specific Gravity, UA 05/02/2025 1.015  1.003 - 1.030   Final    pH, Urine 05/02/2025 5.5  5.0 - 8.0   Final    Protein, UA 05/02/2025 TRACE (A)  NEG mg/dL Final    Glucose, Ur 05/02/2025 Negative  NEG mg/dL Final    Ketones, Urine 05/02/2025 Negative  NEG mg/dL Final    Bilirubin, Urine 05/02/2025 Negative  NEG   Final    Blood, Urine 05/02/2025

## 2025-05-04 LAB
BACTERIA SPEC CULT: ABNORMAL
CC UR VC: ABNORMAL
SERVICE CMNT-IMP: ABNORMAL

## 2025-05-05 ENCOUNTER — RESULTS FOLLOW-UP (OUTPATIENT)
Facility: HOSPITAL | Age: 25
End: 2025-05-05

## 2025-05-05 LAB
BACTERIA SPEC CULT: ABNORMAL
BACTERIA SPEC CULT: ABNORMAL
CC UR VC: ABNORMAL
SERVICE CMNT-IMP: ABNORMAL

## 2025-05-07 ENCOUNTER — HOSPITAL ENCOUNTER (OUTPATIENT)
Facility: HOSPITAL | Age: 25
Setting detail: OBSERVATION
Discharge: HOME | End: 2025-05-07
Attending: EMERGENCY MEDICINE | Admitting: SURGERY
Payer: COMMERCIAL

## 2025-05-07 ENCOUNTER — APPOINTMENT (OUTPATIENT)
Facility: HOSPITAL | Age: 25
End: 2025-05-07
Payer: COMMERCIAL

## 2025-05-07 ENCOUNTER — ANESTHESIA EVENT (OUTPATIENT)
Facility: HOSPITAL | Age: 25
End: 2025-05-07
Payer: COMMERCIAL

## 2025-05-07 ENCOUNTER — ANESTHESIA (OUTPATIENT)
Facility: HOSPITAL | Age: 25
End: 2025-05-07
Payer: COMMERCIAL

## 2025-05-07 VITALS
RESPIRATION RATE: 25 BRPM | HEIGHT: 65 IN | HEART RATE: 77 BPM | SYSTOLIC BLOOD PRESSURE: 127 MMHG | BODY MASS INDEX: 35.22 KG/M2 | WEIGHT: 211.42 LBS | DIASTOLIC BLOOD PRESSURE: 81 MMHG | OXYGEN SATURATION: 93 % | TEMPERATURE: 99 F

## 2025-05-07 DIAGNOSIS — K80.20 CALCULUS OF GALLBLADDER WITHOUT CHOLECYSTITIS WITHOUT OBSTRUCTION: Primary | ICD-10-CM

## 2025-05-07 DIAGNOSIS — K81.9 CHOLECYSTITIS: ICD-10-CM

## 2025-05-07 LAB
ALBUMIN SERPL-MCNC: 3.5 G/DL (ref 3.5–5)
ALBUMIN/GLOB SERPL: 0.9 (ref 1.1–2.2)
ALP SERPL-CCNC: 105 U/L (ref 45–117)
ALT SERPL-CCNC: 40 U/L (ref 12–78)
ANION GAP SERPL CALC-SCNC: 5 MMOL/L (ref 2–12)
AST SERPL-CCNC: 46 U/L (ref 15–37)
BASOPHILS # BLD: 0.07 K/UL (ref 0–0.1)
BASOPHILS NFR BLD: 0.7 % (ref 0–1)
BILIRUB SERPL-MCNC: 0.6 MG/DL (ref 0.2–1)
BUN SERPL-MCNC: 13 MG/DL (ref 6–20)
BUN/CREAT SERPL: 14 (ref 12–20)
CALCIUM SERPL-MCNC: 9.4 MG/DL (ref 8.5–10.1)
CHLORIDE SERPL-SCNC: 108 MMOL/L (ref 97–108)
CO2 SERPL-SCNC: 27 MMOL/L (ref 21–32)
COMMENT:: NORMAL
CREAT SERPL-MCNC: 0.95 MG/DL (ref 0.55–1.02)
DIFFERENTIAL METHOD BLD: ABNORMAL
EOSINOPHIL # BLD: 0.33 K/UL (ref 0–0.4)
EOSINOPHIL NFR BLD: 3.5 % (ref 0–7)
ERYTHROCYTE [DISTWIDTH] IN BLOOD BY AUTOMATED COUNT: 13.4 % (ref 11.5–14.5)
GLOBULIN SER CALC-MCNC: 3.8 G/DL (ref 2–4)
GLUCOSE SERPL-MCNC: 98 MG/DL (ref 65–100)
HCG SERPL-ACNC: 2 MIU/ML (ref 0–6)
HCG UR QL: NEGATIVE
HCT VFR BLD AUTO: 29.3 % (ref 35–47)
HGB BLD-MCNC: 9.3 G/DL (ref 11.5–16)
IMM GRANULOCYTES # BLD AUTO: 0.04 K/UL (ref 0–0.04)
IMM GRANULOCYTES NFR BLD AUTO: 0.4 % (ref 0–0.5)
LIPASE SERPL-CCNC: 34 U/L (ref 13–75)
LYMPHOCYTES # BLD: 1.67 K/UL (ref 0.8–3.5)
LYMPHOCYTES NFR BLD: 17.8 % (ref 12–49)
MCH RBC QN AUTO: 27.4 PG (ref 26–34)
MCHC RBC AUTO-ENTMCNC: 31.7 G/DL (ref 30–36.5)
MCV RBC AUTO: 86.4 FL (ref 80–99)
MONOCYTES # BLD: 0.47 K/UL (ref 0–1)
MONOCYTES NFR BLD: 5 % (ref 5–13)
NEUTS SEG # BLD: 6.78 K/UL (ref 1.8–8)
NEUTS SEG NFR BLD: 72.6 % (ref 32–75)
NRBC # BLD: 0 K/UL (ref 0–0.01)
NRBC BLD-RTO: 0 PER 100 WBC
PLATELET # BLD AUTO: 381 K/UL (ref 150–400)
PMV BLD AUTO: 8.9 FL (ref 8.9–12.9)
POTASSIUM SERPL-SCNC: 4.1 MMOL/L (ref 3.5–5.1)
PROT SERPL-MCNC: 7.3 G/DL (ref 6.4–8.2)
RBC # BLD AUTO: 3.39 M/UL (ref 3.8–5.2)
SODIUM SERPL-SCNC: 140 MMOL/L (ref 136–145)
SPECIMEN HOLD: NORMAL
WBC # BLD AUTO: 9.4 K/UL (ref 3.6–11)

## 2025-05-07 PROCEDURE — 99223 1ST HOSP IP/OBS HIGH 75: CPT | Performed by: SURGERY

## 2025-05-07 PROCEDURE — 96374 THER/PROPH/DIAG INJ IV PUSH: CPT

## 2025-05-07 PROCEDURE — 96375 TX/PRO/DX INJ NEW DRUG ADDON: CPT

## 2025-05-07 PROCEDURE — S2900 ROBOTIC SURGICAL SYSTEM: HCPCS | Performed by: SURGERY

## 2025-05-07 PROCEDURE — 88304 TISSUE EXAM BY PATHOLOGIST: CPT

## 2025-05-07 PROCEDURE — 99285 EMERGENCY DEPT VISIT HI MDM: CPT

## 2025-05-07 PROCEDURE — 84702 CHORIONIC GONADOTROPIN TEST: CPT

## 2025-05-07 PROCEDURE — G0378 HOSPITAL OBSERVATION PER HR: HCPCS

## 2025-05-07 PROCEDURE — 3700000000 HC ANESTHESIA ATTENDED CARE: Performed by: SURGERY

## 2025-05-07 PROCEDURE — 7100000010 HC PHASE II RECOVERY - FIRST 15 MIN: Performed by: SURGERY

## 2025-05-07 PROCEDURE — 7100000000 HC PACU RECOVERY - FIRST 15 MIN: Performed by: SURGERY

## 2025-05-07 PROCEDURE — 6360000002 HC RX W HCPCS: Performed by: SURGERY

## 2025-05-07 PROCEDURE — 76705 ECHO EXAM OF ABDOMEN: CPT

## 2025-05-07 PROCEDURE — 80053 COMPREHEN METABOLIC PANEL: CPT

## 2025-05-07 PROCEDURE — 74177 CT ABD & PELVIS W/CONTRAST: CPT

## 2025-05-07 PROCEDURE — 6360000004 HC RX CONTRAST MEDICATION: Performed by: EMERGENCY MEDICINE

## 2025-05-07 PROCEDURE — 83690 ASSAY OF LIPASE: CPT

## 2025-05-07 PROCEDURE — 3700000001 HC ADD 15 MINUTES (ANESTHESIA): Performed by: SURGERY

## 2025-05-07 PROCEDURE — 2580000003 HC RX 258: Performed by: SURGERY

## 2025-05-07 PROCEDURE — 6360000002 HC RX W HCPCS: Performed by: PHYSICIAN ASSISTANT

## 2025-05-07 PROCEDURE — 85025 COMPLETE CBC W/AUTO DIFF WBC: CPT

## 2025-05-07 PROCEDURE — 6360000002 HC RX W HCPCS: Performed by: EMERGENCY MEDICINE

## 2025-05-07 PROCEDURE — 7100000011 HC PHASE II RECOVERY - ADDTL 15 MIN: Performed by: SURGERY

## 2025-05-07 PROCEDURE — 3600000019 HC SURGERY ROBOT ADDTL 15MIN: Performed by: SURGERY

## 2025-05-07 PROCEDURE — 47562 LAPAROSCOPIC CHOLECYSTECTOMY: CPT | Performed by: SURGERY

## 2025-05-07 PROCEDURE — 3600000009 HC SURGERY ROBOT BASE: Performed by: SURGERY

## 2025-05-07 PROCEDURE — 81025 URINE PREGNANCY TEST: CPT

## 2025-05-07 PROCEDURE — 7100000001 HC PACU RECOVERY - ADDTL 15 MIN: Performed by: SURGERY

## 2025-05-07 PROCEDURE — 36415 COLL VENOUS BLD VENIPUNCTURE: CPT

## 2025-05-07 PROCEDURE — 99254 IP/OBS CNSLTJ NEW/EST MOD 60: CPT | Performed by: PHYSICIAN ASSISTANT

## 2025-05-07 PROCEDURE — 6360000002 HC RX W HCPCS

## 2025-05-07 PROCEDURE — 2500000003 HC RX 250 WO HCPCS: Performed by: PHYSICIAN ASSISTANT

## 2025-05-07 PROCEDURE — 2500000003 HC RX 250 WO HCPCS

## 2025-05-07 PROCEDURE — 94761 N-INVAS EAR/PLS OXIMETRY MLT: CPT

## 2025-05-07 PROCEDURE — 2709999900 HC NON-CHARGEABLE SUPPLY: Performed by: SURGERY

## 2025-05-07 RX ORDER — SODIUM CHLORIDE, SODIUM LACTATE, POTASSIUM CHLORIDE, CALCIUM CHLORIDE 600; 310; 30; 20 MG/100ML; MG/100ML; MG/100ML; MG/100ML
INJECTION, SOLUTION INTRAVENOUS CONTINUOUS
Status: DISCONTINUED | OUTPATIENT
Start: 2025-05-07 | End: 2025-05-07 | Stop reason: HOSPADM

## 2025-05-07 RX ORDER — BUPIVACAINE HYDROCHLORIDE 5 MG/ML
INJECTION, SOLUTION PERINEURAL PRN
Status: DISCONTINUED | OUTPATIENT
Start: 2025-05-07 | End: 2025-05-07 | Stop reason: ALTCHOICE

## 2025-05-07 RX ORDER — KETOROLAC TROMETHAMINE 30 MG/ML
INJECTION, SOLUTION INTRAMUSCULAR; INTRAVENOUS
Status: DISCONTINUED | OUTPATIENT
Start: 2025-05-07 | End: 2025-05-07 | Stop reason: SDUPTHER

## 2025-05-07 RX ORDER — LIDOCAINE HYDROCHLORIDE 20 MG/ML
INJECTION, SOLUTION EPIDURAL; INFILTRATION; INTRACAUDAL; PERINEURAL
Status: DISCONTINUED | OUTPATIENT
Start: 2025-05-07 | End: 2025-05-07 | Stop reason: SDUPTHER

## 2025-05-07 RX ORDER — MORPHINE SULFATE 4 MG/ML
4 INJECTION, SOLUTION INTRAMUSCULAR; INTRAVENOUS
Refills: 0 | Status: COMPLETED | OUTPATIENT
Start: 2025-05-07 | End: 2025-05-07

## 2025-05-07 RX ORDER — PHENYLEPHRINE HCL IN 0.9% NACL 0.4MG/10ML
SYRINGE (ML) INTRAVENOUS
Status: DISCONTINUED | OUTPATIENT
Start: 2025-05-07 | End: 2025-05-07 | Stop reason: SDUPTHER

## 2025-05-07 RX ORDER — ONDANSETRON 2 MG/ML
4 INJECTION INTRAMUSCULAR; INTRAVENOUS ONCE
Status: COMPLETED | OUTPATIENT
Start: 2025-05-07 | End: 2025-05-07

## 2025-05-07 RX ORDER — SODIUM CHLORIDE 0.9 % (FLUSH) 0.9 %
5-40 SYRINGE (ML) INJECTION PRN
Status: DISCONTINUED | OUTPATIENT
Start: 2025-05-07 | End: 2025-05-07 | Stop reason: HOSPADM

## 2025-05-07 RX ORDER — FENTANYL CITRATE 50 UG/ML
INJECTION, SOLUTION INTRAMUSCULAR; INTRAVENOUS
Status: DISCONTINUED | OUTPATIENT
Start: 2025-05-07 | End: 2025-05-07 | Stop reason: SDUPTHER

## 2025-05-07 RX ORDER — DIPHENHYDRAMINE HYDROCHLORIDE 50 MG/ML
12.5 INJECTION, SOLUTION INTRAMUSCULAR; INTRAVENOUS
Status: DISCONTINUED | OUTPATIENT
Start: 2025-05-07 | End: 2025-05-07 | Stop reason: HOSPADM

## 2025-05-07 RX ORDER — OXYCODONE HYDROCHLORIDE 5 MG/1
5 TABLET ORAL EVERY 6 HOURS PRN
Qty: 12 TABLET | Refills: 0 | Status: SHIPPED | OUTPATIENT
Start: 2025-05-07 | End: 2025-05-10

## 2025-05-07 RX ORDER — SODIUM CHLORIDE 0.9 % (FLUSH) 0.9 %
5-40 SYRINGE (ML) INJECTION EVERY 12 HOURS SCHEDULED
Status: DISCONTINUED | OUTPATIENT
Start: 2025-05-07 | End: 2025-05-07 | Stop reason: HOSPADM

## 2025-05-07 RX ORDER — SUCCINYLCHOLINE/SOD CL,ISO/PF 100 MG/5ML
SYRINGE (ML) INTRAVENOUS
Status: DISCONTINUED | OUTPATIENT
Start: 2025-05-07 | End: 2025-05-07 | Stop reason: SDUPTHER

## 2025-05-07 RX ORDER — POTASSIUM CHLORIDE 7.45 MG/ML
10 INJECTION INTRAVENOUS PRN
Status: DISCONTINUED | OUTPATIENT
Start: 2025-05-07 | End: 2025-05-07 | Stop reason: HOSPADM

## 2025-05-07 RX ORDER — MIDAZOLAM HYDROCHLORIDE 1 MG/ML
INJECTION, SOLUTION INTRAMUSCULAR; INTRAVENOUS
Status: DISCONTINUED | OUTPATIENT
Start: 2025-05-07 | End: 2025-05-07 | Stop reason: SDUPTHER

## 2025-05-07 RX ORDER — MAGNESIUM SULFATE IN WATER 40 MG/ML
2000 INJECTION, SOLUTION INTRAVENOUS PRN
Status: DISCONTINUED | OUTPATIENT
Start: 2025-05-07 | End: 2025-05-07 | Stop reason: HOSPADM

## 2025-05-07 RX ORDER — ONDANSETRON 2 MG/ML
INJECTION INTRAMUSCULAR; INTRAVENOUS
Status: DISCONTINUED | OUTPATIENT
Start: 2025-05-07 | End: 2025-05-07 | Stop reason: SDUPTHER

## 2025-05-07 RX ORDER — POTASSIUM CHLORIDE 750 MG/1
40 TABLET, EXTENDED RELEASE ORAL PRN
Status: DISCONTINUED | OUTPATIENT
Start: 2025-05-07 | End: 2025-05-07 | Stop reason: HOSPADM

## 2025-05-07 RX ORDER — NALOXONE HYDROCHLORIDE 0.4 MG/ML
INJECTION, SOLUTION INTRAMUSCULAR; INTRAVENOUS; SUBCUTANEOUS PRN
Status: DISCONTINUED | OUTPATIENT
Start: 2025-05-07 | End: 2025-05-07 | Stop reason: HOSPADM

## 2025-05-07 RX ORDER — SODIUM CHLORIDE 9 MG/ML
INJECTION, SOLUTION INTRAVENOUS PRN
Status: DISCONTINUED | OUTPATIENT
Start: 2025-05-07 | End: 2025-05-07 | Stop reason: HOSPADM

## 2025-05-07 RX ORDER — ROCURONIUM BROMIDE 10 MG/ML
INJECTION, SOLUTION INTRAVENOUS
Status: DISCONTINUED | OUTPATIENT
Start: 2025-05-07 | End: 2025-05-07 | Stop reason: SDUPTHER

## 2025-05-07 RX ORDER — ONDANSETRON 2 MG/ML
4 INJECTION INTRAMUSCULAR; INTRAVENOUS EVERY 6 HOURS PRN
Status: DISCONTINUED | OUTPATIENT
Start: 2025-05-07 | End: 2025-05-07 | Stop reason: HOSPADM

## 2025-05-07 RX ORDER — ONDANSETRON 4 MG/1
4 TABLET, ORALLY DISINTEGRATING ORAL EVERY 8 HOURS PRN
Status: DISCONTINUED | OUTPATIENT
Start: 2025-05-07 | End: 2025-05-07 | Stop reason: HOSPADM

## 2025-05-07 RX ORDER — DEXAMETHASONE SODIUM PHOSPHATE 4 MG/ML
INJECTION, SOLUTION INTRA-ARTICULAR; INTRALESIONAL; INTRAMUSCULAR; INTRAVENOUS; SOFT TISSUE
Status: DISCONTINUED | OUTPATIENT
Start: 2025-05-07 | End: 2025-05-07 | Stop reason: SDUPTHER

## 2025-05-07 RX ORDER — PROPOFOL 10 MG/ML
INJECTION, EMULSION INTRAVENOUS
Status: DISCONTINUED | OUTPATIENT
Start: 2025-05-07 | End: 2025-05-07 | Stop reason: SDUPTHER

## 2025-05-07 RX ORDER — IOPAMIDOL 755 MG/ML
100 INJECTION, SOLUTION INTRAVASCULAR
Status: COMPLETED | OUTPATIENT
Start: 2025-05-07 | End: 2025-05-07

## 2025-05-07 RX ORDER — ONDANSETRON 2 MG/ML
4 INJECTION INTRAMUSCULAR; INTRAVENOUS
Status: DISCONTINUED | OUTPATIENT
Start: 2025-05-07 | End: 2025-05-07 | Stop reason: HOSPADM

## 2025-05-07 RX ORDER — LIDOCAINE HYDROCHLORIDE 10 MG/ML
1 INJECTION, SOLUTION EPIDURAL; INFILTRATION; INTRACAUDAL; PERINEURAL
Status: DISCONTINUED | OUTPATIENT
Start: 2025-05-07 | End: 2025-05-07 | Stop reason: HOSPADM

## 2025-05-07 RX ORDER — MIDAZOLAM HYDROCHLORIDE 2 MG/2ML
2 INJECTION, SOLUTION INTRAMUSCULAR; INTRAVENOUS
Status: DISCONTINUED | OUTPATIENT
Start: 2025-05-07 | End: 2025-05-07 | Stop reason: HOSPADM

## 2025-05-07 RX ORDER — FENTANYL CITRATE 50 UG/ML
100 INJECTION, SOLUTION INTRAMUSCULAR; INTRAVENOUS
Refills: 0 | Status: DISCONTINUED | OUTPATIENT
Start: 2025-05-07 | End: 2025-05-07 | Stop reason: HOSPADM

## 2025-05-07 RX ADMIN — FENTANYL CITRATE 50 MCG: 50 INJECTION, SOLUTION INTRAMUSCULAR; INTRAVENOUS at 11:30

## 2025-05-07 RX ADMIN — LIDOCAINE HYDROCHLORIDE 60 MG: 20 INJECTION, SOLUTION EPIDURAL; INFILTRATION; INTRACAUDAL; PERINEURAL at 10:49

## 2025-05-07 RX ADMIN — MIDAZOLAM HYDROCHLORIDE 2 MG: 1 INJECTION, SOLUTION INTRAMUSCULAR; INTRAVENOUS at 10:45

## 2025-05-07 RX ADMIN — FENTANYL CITRATE 50 MCG: 50 INJECTION, SOLUTION INTRAMUSCULAR; INTRAVENOUS at 11:18

## 2025-05-07 RX ADMIN — HYDROMORPHONE HYDROCHLORIDE 0.25 MG: 1 INJECTION, SOLUTION INTRAMUSCULAR; INTRAVENOUS; SUBCUTANEOUS at 13:23

## 2025-05-07 RX ADMIN — IOPAMIDOL 100 ML: 755 INJECTION, SOLUTION INTRAVENOUS at 07:02

## 2025-05-07 RX ADMIN — FENTANYL CITRATE 50 MCG: 50 INJECTION, SOLUTION INTRAMUSCULAR; INTRAVENOUS at 10:45

## 2025-05-07 RX ADMIN — SUGAMMADEX 200 MG: 100 INJECTION, SOLUTION INTRAVENOUS at 11:40

## 2025-05-07 RX ADMIN — ONDANSETRON 4 MG: 2 INJECTION, SOLUTION INTRAMUSCULAR; INTRAVENOUS at 06:12

## 2025-05-07 RX ADMIN — SODIUM CHLORIDE, SODIUM LACTATE, POTASSIUM CHLORIDE, AND CALCIUM CHLORIDE: .6; .31; .03; .02 INJECTION, SOLUTION INTRAVENOUS at 10:22

## 2025-05-07 RX ADMIN — ONDANSETRON 4 MG: 2 INJECTION, SOLUTION INTRAMUSCULAR; INTRAVENOUS at 11:00

## 2025-05-07 RX ADMIN — ROCURONIUM BROMIDE 40 MG: 10 INJECTION INTRAVENOUS at 10:53

## 2025-05-07 RX ADMIN — PROPOFOL 50 MG: 10 INJECTION, EMULSION INTRAVENOUS at 10:51

## 2025-05-07 RX ADMIN — PROPOFOL 150 MG: 10 INJECTION, EMULSION INTRAVENOUS at 10:49

## 2025-05-07 RX ADMIN — KETOROLAC TROMETHAMINE 30 MG: 30 INJECTION, SOLUTION INTRAMUSCULAR at 11:44

## 2025-05-07 RX ADMIN — MIDAZOLAM HYDROCHLORIDE 3 MG: 1 INJECTION, SOLUTION INTRAMUSCULAR; INTRAVENOUS at 10:44

## 2025-05-07 RX ADMIN — ROCURONIUM BROMIDE 10 MG: 10 INJECTION INTRAVENOUS at 10:49

## 2025-05-07 RX ADMIN — CEFAZOLIN SODIUM 2000 MG: 1 POWDER, FOR SOLUTION INTRAMUSCULAR; INTRAVENOUS at 10:58

## 2025-05-07 RX ADMIN — FENTANYL CITRATE 50 MCG: 50 INJECTION, SOLUTION INTRAMUSCULAR; INTRAVENOUS at 10:49

## 2025-05-07 RX ADMIN — Medication 80 MCG: at 11:09

## 2025-05-07 RX ADMIN — MORPHINE SULFATE 4 MG: 4 INJECTION INTRAVENOUS at 06:12

## 2025-05-07 RX ADMIN — DEXAMETHASONE SODIUM PHOSPHATE 8 MG: 4 INJECTION INTRA-ARTICULAR; INTRALESIONAL; INTRAMUSCULAR; INTRAVENOUS; SOFT TISSUE at 11:00

## 2025-05-07 RX ADMIN — Medication 100 MG: at 10:50

## 2025-05-07 ASSESSMENT — PAIN DESCRIPTION - ORIENTATION: ORIENTATION: RIGHT

## 2025-05-07 ASSESSMENT — PAIN DESCRIPTION - LOCATION
LOCATION: ABDOMEN

## 2025-05-07 ASSESSMENT — PAIN SCALES - GENERAL
PAINLEVEL_OUTOF10: 8
PAINLEVEL_OUTOF10: 5
PAINLEVEL_OUTOF10: 7

## 2025-05-07 ASSESSMENT — PAIN DESCRIPTION - PAIN TYPE: TYPE: ACUTE PAIN

## 2025-05-07 ASSESSMENT — PAIN DESCRIPTION - DESCRIPTORS: DESCRIPTORS: SHARP

## 2025-05-07 ASSESSMENT — PAIN - FUNCTIONAL ASSESSMENT: PAIN_FUNCTIONAL_ASSESSMENT: 0-10

## 2025-05-07 NOTE — DISCHARGE INSTRUCTIONS
Over The Counter Pain Medications    To achieve optimal pain control, the below regimen is recommended:  Alternate ibuprofen and acetaminophen/Tylenol on a set schedule, even if you are not experiencing significant pain or discomfort, for the first 5-10 days after surgery, or as advised by your surgeon. You will take medication every three to four hours, alternating between ibuprofen and Tylenol.   These medications are sold over the counter at pharmacies and grocery stores. They do not require a prescription.      Ibuprofen  Ibuprofen is typically sold in 200mg capsules. Ibuprofen is often sold under the brand names Advil or Motrin, it is the same medication.   It is recommended you take 600-800mg ibuprofen at a time, so you will need to take 3-4 200mg capsules at once.   You should take this with food and a glass of water.  Do not take ibuprofen if you have a history of stomach/intestinal ulcers, gastric bypass, or kidney disease.                                                              Tylenol   Acetaminophen/Tylenol is typically sold in 325mg or 500 tablets. To take 1000mg at a time, you will need to take two 500mg tablets or three 325mg tablets (975mg).  You can take this on an empty stomach. You do not need to take this with food.  Do not take Acetaminophen/Tylenol if you have a history of liver disease, including hepatitis, cirrhosis, or alcoholism.  Do not consume alcohol while taking Acetaminophen/Tylenol.  Ask your medical provider if you have any questions about which medications are safe to take.    Below is an example of a medication schedule:  7am: 600-800mg ibuprofen with breakfast  10 am: 1000mg acetaminophen/Tylenol  1pm: 600-800mg with lunch  4pm: 1000mg acetaminophen/Tylenol  7pm:  600-800mg ibuprofen with dinner  10am: 1000mg acetaminophen/Tylenol before bedPOST-OPERATIVE INSTRUCTIONS  OUTPATIENT SURGERY Robotic CHOLECYSTECTOMY    Today you underwent a robotic cholecystectomy which is usually  any other staff member who will be able to assist you.    Anny Navarrete MD  [facility]  [address] [place] [id number] [phone]  F: [phone]

## 2025-05-07 NOTE — ED PROVIDER NOTES
[facility] EMERGENCY DEPARTMENT  EMERGENCY DEPARTMENT ENCOUNTER      Pt Name: Loni Jain  MRN: [id number]  Birthdate 2000  Date of evaluation: 5/7/2025  Provider: Leland Schulte MD      HISTORY OF PRESENT ILLNESS      24-year-old female recent delivery presents to the emergency department via right abdominal pain which has been going on for last 2 or 3 hours.  Third visit for the same.  She has been seen previously for pain associated with eating fatty foods and diagnosed with symptomatic cholelithiasis without cholecystitis.  No fevers.    The history is provided by the patient and medical records.           Nursing Notes were reviewed.    REVIEW OF SYSTEMS         Review of Systems        PAST MEDICAL HISTORY     Past Medical History:   Diagnosis Date    Papanicolaou smear for cervical cancer screening 10/29/2024    Normal         SURGICAL HISTORY       Past Surgical History:   Procedure Laterality Date    WISDOM TOOTH EXTRACTION  2021         CURRENT MEDICATIONS       Previous Medications    FERROUS SULFATE (FE TABS) 325 (65 FE) MG EC TABLET    Take 1 tablet by mouth daily (with breakfast)    IBUPROFEN (ADVIL;MOTRIN) 800 MG TABLET    Take 1 tablet by mouth every 8 hours    LABETALOL (NORMODYNE) 300 MG TABLET    Take 1 tablet by mouth in the morning, at noon, and at bedtime    PRENATAL VIT-FE FUMARATE-FA (PRENATAL VITAMIN) 27-0.8 MG TABS    Take 1 tablet by mouth daily       ALLERGIES     Citrus and Molds & smuts    FAMILY HISTORY       Family History   Problem Relation Age of Onset    Migraines Mother     Diabetes Paternal Grandmother           SOCIAL HISTORY       Social History     Socioeconomic History    Marital status: Single   Tobacco Use    Smoking status: Never    Smokeless tobacco: Never   Vaping Use    Vaping status: Never Used   Substance and Sexual Activity    Alcohol use: Not Currently    Drug use: Never    Sexual activity: Yes     Partners: Male     Social Drivers

## 2025-05-07 NOTE — ED NOTES
Change of shift. Care of patient taken over from Dr. Schulte; H&P reviewed, handoff complete.    24-year-old female with right upper quadrant abdominal pain, questionable acute cholecystitis pending CT read, will consult surgery.    8:22 AM  Spoke to surgery PA who will talk to Dr Navarrete gen surg    8:22 AM  Patient's ultrasound shows a tender gallbladder containing biliary sludge and small calculi, cannot rule out acute cholecystitis follow-up CT showed probable biliary sludge and small calculi, she has no leukocytosis or fever.  Although the this is the patient's third visit for similar symptoms.  Discussed case with general surgery.  Patient would like to have her gallbladder removed.  They will evaluate the patient and discuss cholecystectomy.  Admitted to the general surgery service.    Perfect Serve Consult for Admission  8:22 AM    ED Room Number: ER08/08  Patient Name and age:  Loni Jain 24 y.o.  female  Working Diagnosis:   1. Calculus of gallbladder without cholecystitis without obstruction         Nelson Crews DO  05/07/25 0822

## 2025-05-07 NOTE — H&P
Surgery History and Physical    Subjective:      Loni Jain is a 24 y.o. female with a history of gestational hypertension on labetalol, status post induced vaginal delivery on 4/18/2025, who presented to the ED overnight with severe right upper quadrant pain nausea and vomiting.  She state she has had right upper quadrant pain since she was in the hospital to deliver her baby a few weeks ago.  She has been to the ED 2 times previously for similar episodes, though not as severe.  Her symptoms have improved today since not eating or drinking.  Denies any fevers chills.  Reports constipation.  Denies any past medical history otherwise, no anticoagulation use.  No past surgical history.    Past Medical History:   Diagnosis Date    Papanicolaou smear for cervical cancer screening 10/29/2024    Normal     Past Surgical History:   Procedure Laterality Date    WISDOM TOOTH EXTRACTION  2021      Family History   Problem Relation Age of Onset    Migraines Mother     Diabetes Paternal Grandmother      Social History     Socioeconomic History    Marital status: Single   Tobacco Use    Smoking status: Never    Smokeless tobacco: Never   Vaping Use    Vaping status: Never Used   Substance and Sexual Activity    Alcohol use: Not Currently    Drug use: Never    Sexual activity: Yes     Partners: Male     Social Drivers of Health     Food Insecurity: No Food Insecurity (4/16/2025)    Hunger Vital Sign     Worried About Running Out of Food in the Last Year: Never true     Ran Out of Food in the Last Year: Never true   Transportation Needs: No Transportation Needs (4/16/2025)    PRAPARE - Transportation     Lack of Transportation (Medical): No     Lack of Transportation (Non-Medical): No   Housing Stability: Low Risk  (4/16/2025)    Housing Stability Vital Sign     Unable to Pay for Housing in the Last Year: No     Number of Times Moved in the Last Year: 0     Homeless in the Last Year: No      Current Facility-Administered

## 2025-05-07 NOTE — ANESTHESIA PRE PROCEDURE
Department of Anesthesiology  Preprocedure Note       Name:  Loni Jain   Age:  24 y.o.  :  2000                                          MRN:  [id number]         Date:  2025      Surgeon: Surgeon(s):  Anny Navarrete MD    Procedure: Procedure(s):  CHOLECYSTECTOMY LAPAROSCOPIC ROBOTIC    Medications prior to admission:   Prior to Admission medications    Medication Sig Start Date End Date Taking? Authorizing Provider   labetalol (NORMODYNE) 300 MG tablet Take 1 tablet by mouth in the morning, at noon, and at bedtime 25  Yes Mamta Yin MD   ibuprofen (ADVIL;MOTRIN) 800 MG tablet Take 1 tablet by mouth every 8 hours 25  Yes Mamta Yin MD   ferrous sulfate (FE TABS) 325 (65 Fe) MG EC tablet Take 1 tablet by mouth daily (with breakfast) 25  Yes Mamta Yin MD   Prenatal Vit-Fe Fumarate-FA (PRENATAL VITAMIN) 27-0.8 MG TABS Take 1 tablet by mouth daily  Patient not taking: Reported on 2025 10/29/24   Eveline Wilkerson MD       Current medications:    Current Facility-Administered Medications   Medication Dose Route Frequency Provider Last Rate Last Admin   • lactated ringers infusion   IntraVENous Continuous Anny Navarrete  mL/hr at 25 1022 New Bag at 25 1022   • sodium chloride flush 0.9 % injection 5-40 mL  5-40 mL IntraVENous 2 times per day Anny Navarrete MD       • sodium chloride flush 0.9 % injection 5-40 mL  5-40 mL IntraVENous PRN Anny Navarrete MD       • 0.9 % sodium chloride infusion   IntraVENous PRN Anny Navarrete MD       • potassium chloride (KLOR-CON) extended release tablet 40 mEq  40 mEq Oral PRN Anny Navarrete MD        Or   • potassium bicarb-citric acid (EFFER-K) effervescent tablet 40 mEq  40 mEq Oral PRN Anny Navarrete MD        Or   • potassium chloride 10 mEq/100 mL IVPB (Peripheral Line)  10 mEq IntraVENous PRN Anny Navarrete MD       • magnesium sulfate 2000 mg in 50 mL

## 2025-05-07 NOTE — ANESTHESIA POSTPROCEDURE EVALUATION
Department of Anesthesiology  Postprocedure Note    Patient: Loni Jain  MRN: [id number]  YOB: 2000  Date of evaluation: 5/7/2025    Procedure Summary       Date: 05/07/25 Room / Location: [facility] MAIN OR  / [facility] MAIN OR    Anesthesia Start: 1044 Anesthesia Stop: 1203    Procedure: CHOLECYSTECTOMY LAPAROSCOPIC ROBOTIC (Abdomen) Diagnosis:       Cholecystitis      (Cholecystitis [K81.9])    Surgeons: Anny Navarrete MD Responsible Provider: Julián Montalvo MD    Anesthesia Type: General ASA Status: 2            Anesthesia Type: General    Denise Phase I: Denise Score: 8    Denise Phase II: Denise Score: 10    Anesthesia Post Evaluation    Patient location during evaluation: PACU  Patient participation: complete - patient participated  Level of consciousness: awake  Airway patency: patent  Nausea & Vomiting: no vomiting and no nausea  Cardiovascular status: hemodynamically stable  Respiratory status: acceptable  Hydration status: stable  Pain management: adequate    No notable events documented.

## 2025-05-07 NOTE — ED TRIAGE NOTES
Patient ambulatory to ED room for complaints of abdominal pain.    Patient states she woke up with mid epigastric pain radiating to her back started at 2 am this morning associated with nausea.    Denies fever, headache, vomiting.    Pmx: Gallstone

## 2025-05-07 NOTE — OP NOTE
Patient: Loni Jain MRN: [id number]  SSN: [id number]    YOB: 2000  Age: 24 y.o.  Sex: female          OPERATIVE REPORT - LAPAROSCOPIC ASSISTED ROBOTIC   MULTIPORT CHOLECYSTECTOMY    PREOPERATIVE DIAGNOSIS: Biliary Colic.    POSTOPERATIVE DIAGNOSIS: Biliary Colic     OPERATIVE PROCEDURE:  Robotic assisted laparoscopic multiport cholecystectomy.      SURGEON: Anny Navarrete MD    ANESTHESIA: General.    ANESTHESIOLOGIST: General    COMPLICATIONS: None    ESTIMATED BLOOD LOSS: Minimal.    INDICATION: Documented in the history and physical.    FINDINGS: decompressed gallbladder    PROCEDURE: Patient was brought in the room and placed supine on the operating table.  After general anesthesia induction and placement of endotracheal tube, patient's  abdomen was prepped and draped in standard surgical fashion. The  laparoscopic camera and CO2 insufflation apparatus were affixed to the  drapes in the usual fashion.    Through an infraumbilical incision, a Veress needle was introduced and its  intraperitoneal position was confirmed with low intra-abdominal initial pressures. CO2 insufflation was connected and pneumoperitoneum was created and maintained at 15 mmHg. An 8-mm robotic trocar was introduced and 8mm robotic camera  Was passed through and immediate area was inspected and it was confirmed that there was no  intraabdominal injury during introduction of pneumoperitoneum and the  trocar.    Under direct vision, 2 8-mm robotic ports were positioned, one to the  Left upper quadrant, one at the right upper quadrant. Patient was positioned in reverse Trendelenburg with a tilt to the  left.    The fundus was grasped and lifted up towards the diaphragm. The  infundibulum was retracted laterally and inferiorly after releasing the  adhesions.    The junction of the cystic duct and gallbladder was clearly identified, and  an adequate length of the cystic duct was dissected out. Similarly, the

## 2025-05-09 NOTE — PROGRESS NOTES
Patient was seen 4/9/2025      Please look under media to view full consult and ultrasound report in [employer].         Victor M Taylor MD  Maternal Fetal Medicine    not assessed as pt left seated in chair at end of session

## 2025-05-15 ENCOUNTER — TELEPHONE (OUTPATIENT)
Age: 25
End: 2025-05-15

## 2025-05-15 NOTE — PROGRESS NOTES
Loni Jain is a 24 y.o. female returns for a routine post-partum follow-up visit     Chief Complaint   Patient presents with    Postpartum Care       Postpartum Depression: Low Risk  (2025)    [place]  Depression Scale     Last EPDS Total Score: 1     Last EPDS Self Harm Result: Never         Type of delivery: normal spontaneous vaginal delivery  Date of Delivery: 2025  Breastfeeding: no  Bleeding Resolved: spotting  Birth Control: none.  Last Pap: normal obtained 10/29/2024        Problems: problems - 2 weeks after she had baby, patient had to get gallbladder removed    1. Have you been to the ER, urgent care clinic, or hospitalized since your last visit? No    2. Have you seen or consulted any other health care providers outside of the [facility] since your last visit? No    Examination chaperoned by Arsen Downs MA.

## 2025-05-15 NOTE — TELEPHONE ENCOUNTER
Incoming call from patient reporting a post op problem. Patient's name and  was verified. Patient is, randomly throughout the day, experiencing chest pain that stings with 5-6 rate of pain. She also wants to know if she can move the white tape above the lesions. She was informed a nurse will be contacting her shortly. Call back number [phone]

## 2025-05-15 NOTE — TELEPHONE ENCOUNTER
Returned call to patient and verified using 2 identifiers.  Pt is 8 days post op laparoscopic cholecystectomy on 5/7. Pt states that she is having 5/10 pain in the chest and back region She denies any nausea, or vomiting but her stools seems to have some yellowish tinge in it.      Pt was informed that this sounds more like gas pains. She was informed that if she was having sharp chest pains with left arm numbness then she needed to go to the ER.   Pt states that she was not having those symptoms. Pt was informed that I have spoken with Dr. Navarrete and she advises to try Gas-X, make sure that she is up walking around and eating light. Pt states that she has been eating light and she will try the recommendations.    Pt also wanted to know when she can take off the steri-strips due to it is causing some itching. Pt was informed can remove them if causing irritation. Pt was informed to call the office if symptoms get worse or go to the ER. Pt voiced understandings.

## 2025-05-19 ENCOUNTER — TELEPHONE (OUTPATIENT)
Age: 25
End: 2025-05-19

## 2025-05-19 NOTE — TELEPHONE ENCOUNTER
Outbound call to patient to move upcoming appointment with Dr. Nath to an earlier time because Dr. Navarrete needs to leave the office earlier. LVM for patient to call office back and office number was provided.

## 2025-05-21 ENCOUNTER — TELEPHONE (OUTPATIENT)
Age: 25
End: 2025-05-21

## 2025-05-21 NOTE — TELEPHONE ENCOUNTER
Outbound call to patient in regards to an up coming appointment. Could not leave VM due to it not being set up yet

## 2025-05-22 ENCOUNTER — OFFICE VISIT (OUTPATIENT)
Age: 25
End: 2025-05-22

## 2025-05-22 VITALS
DIASTOLIC BLOOD PRESSURE: 78 MMHG | RESPIRATION RATE: 14 BRPM | HEART RATE: 78 BPM | OXYGEN SATURATION: 99 % | HEIGHT: 65 IN | SYSTOLIC BLOOD PRESSURE: 123 MMHG | TEMPERATURE: 98.4 F | WEIGHT: 212 LBS | BODY MASS INDEX: 35.32 KG/M2

## 2025-05-22 DIAGNOSIS — Z98.890 STATUS POST SURGERY: Primary | ICD-10-CM

## 2025-05-22 PROCEDURE — 99024 POSTOP FOLLOW-UP VISIT: CPT | Performed by: SURGERY

## 2025-05-22 ASSESSMENT — PATIENT HEALTH QUESTIONNAIRE - PHQ9
SUM OF ALL RESPONSES TO PHQ QUESTIONS 1-9: 0
1. LITTLE INTEREST OR PLEASURE IN DOING THINGS: NOT AT ALL
2. FEELING DOWN, DEPRESSED OR HOPELESS: NOT AT ALL
SUM OF ALL RESPONSES TO PHQ QUESTIONS 1-9: 0

## 2025-05-22 NOTE — PROGRESS NOTES
Surgery Progress Note    5/22/2025    had concerns including Post-Op Check (Robotic lap cholecystectomy 5/22).     Subjective:     Patient is a 24 y.o. female who is s/p robotic cholecystectomy with Dr Navarrete about two weeks ago. Patient has minimal  pain. Tolerating diet with no nausea/vomiting/diarrhea/constipation. No fevers or chills. Doing well with no other significant complaints.        Past Medical History:   Diagnosis Date    Hypertension     Papanicolaou smear for cervical cancer screening 10/29/2024    Normal        Past Surgical History:   Procedure Laterality Date    CHOLECYSTECTOMY, LAPAROSCOPIC N/A 5/7/2025    CHOLECYSTECTOMY LAPAROSCOPIC ROBOTIC performed by Anny Navarrete MD at [facility] MAIN OR    WISDOM TOOTH EXTRACTION  2021          Objective:     Vitals:    05/22/25 1400   BP: 123/78   Pulse: 78   Resp: 14   Temp: 98.4 °F (36.9 °C)   SpO2: 99%     Body mass index is 35.28 kg/m².  Wt Readings from Last 3 Encounters:   05/22/25 96.2 kg (212 lb)   05/07/25 95.9 kg (211 lb 6.7 oz)   05/02/25 101.4 kg (223 lb 8.7 oz)        General: No acute distress, conversant  Eyes: PERRLA, no scleral icterus  HENT: Normocephalic, PEERLA  GI: Soft, NT, ND, incisions clean, dry, intact  Psych: Appropriate mood and affect      Assessment / Plan:       24 y.o. y/o female   Doing well. Pathology reviewed.   Satisfactory post-op course  No heavy lifting (>15lbs) for 4 weeks from date of surgery      SOMMER Katz   [facility]

## 2025-05-22 NOTE — PROGRESS NOTES
Identified pt with two pt identifiers (name and ). Reviewed chart in preparation for visit and have obtained necessary documentation.    oLni Jain is a 24 y.o. female  Chief Complaint   Patient presents with    Post-Op Check     Robotic lap cholecystectomy      /78 (BP Site: Left Upper Arm, Patient Position: Sitting, BP Cuff Size: Large Adult)   Pulse 78   Temp 98.4 °F (36.9 °C) (Oral)   Resp 14   Ht 1.651 m (5' 5\")   Wt 96.2 kg (212 lb)   LMP 2024   SpO2 99%   BMI 35.28 kg/m²     1. Have you been to the ER, urgent care clinic since your last visit?  Hospitalized since your last visit?no    2. Have you seen or consulted any other health care providers outside of the [facility] since your last visit?  Include any pap smears or colon screening. no

## 2025-05-28 ENCOUNTER — OFFICE VISIT (OUTPATIENT)
Age: 25
End: 2025-05-28

## 2025-05-28 VITALS
SYSTOLIC BLOOD PRESSURE: 119 MMHG | DIASTOLIC BLOOD PRESSURE: 76 MMHG | HEART RATE: 82 BPM | WEIGHT: 213 LBS | OXYGEN SATURATION: 99 % | BODY MASS INDEX: 35.49 KG/M2 | HEIGHT: 65 IN

## 2025-05-28 PROBLEM — O09.90 SUPERVISION OF HIGH RISK PREGNANCY, ANTEPARTUM: Status: RESOLVED | Noted: 2024-10-29 | Resolved: 2025-05-28

## 2025-05-28 PROCEDURE — 0503F POSTPARTUM CARE VISIT: CPT | Performed by: OBSTETRICS & GYNECOLOGY

## 2025-05-28 NOTE — PROGRESS NOTES
Postpartum evaluation  Type of delivery: normal spontaneous vaginal delivery  Date of Delivery: April 18, 2025  Breastfeeding: no  Bleeding Resolved: spotting  Birth Control: none.  Last Pap: normal obtained 10/29/2024     Loni Jain is a 24 y.o. female who presents for a postpartum exam. Had her gallbladder out    She is now six weeks post normal spontaneous vaginal delivery.    Her baby is doing well.    She has had no menses since delivery.     She has had the following significant problems since her delivery: none    The patient is bottle feeding without difficulty.        She is currently taking: no medications.     She is due for her next AE in 4 months.     /76 (BP Site: Right Upper Arm, Patient Position: Sitting, BP Cuff Size: Large Adult)   Pulse 82   Ht 1.651 m (5' 5\")   Wt 96.6 kg (213 lb)   LMP 07/20/2024   SpO2 99%   Breastfeeding No   BMI 35.45 kg/m²     PHYSICAL EXAMINATION    Constitutional  Appearance: well-nourished, well developed, alert, in no acute distress    HENT  Head and Face: appears normal    Neck  Inspection/Palpation: normal appearance, no masses or tenderness  Lymph Nodes: no lymphadenopathy present  Thyroid: gland size normal, nontender, no nodules or masses present on palpation    Breasts  Inspection of Breasts: breasts symmetrical, no skin changes, no discharge present, nipple appearance normal, no skin retraction present  Palpation of Breasts and Axillae: no masses present on palpation, no breast tenderness  Axillary Lymph Nodes: no lymphadenopathy present    Gastrointestinal  Abdominal Examination: abdomen non-tender to palpation, normal bowel sounds, no masses present  Liver and spleen: no hepatomegaly present, spleen not palpable  Hernias: no hernias identified    Genitourinary  External Genitalia: normal appearance for age, no discharge present, no tenderness present, no inflammatory lesions present, no masses present, no atrophy present  Vagina: normal

## (undated) DEVICE — ARM DRAPE

## (undated) DEVICE — SOLUTION IV 500ML 0.9% SOD BOTTLE CHL LTWT DURABLE SHATTERPROOF

## (undated) DEVICE — SOLUTION IRRIG 1000ML H2O PIC PLAS SHATTERPROOF CONTAINER

## (undated) DEVICE — TRANSFER SET 3": Brand: MEDLINE INDUSTRIES, INC.

## (undated) DEVICE — INSUFFLATION NEEDLE TO ESTABLISH PNEUMOPERITONEUM.: Brand: INSUFFLATION NEEDLE

## (undated) DEVICE — ANCHOR TISSUE RETRIEVAL SYSTEM, BAG SIZE 125 ML, PORT SIZE 8 MM: Brand: ANCHOR TISSUE RETRIEVAL SYSTEM

## (undated) DEVICE — BANDAGE ADH W2XL4IN NITRL FAB STRP CURAD

## (undated) DEVICE — AIRSEAL BIFURCATED SMOKE EVAC FILTERED TUBE SET: Brand: AIRSEAL

## (undated) DEVICE — SEAL

## (undated) DEVICE — BLADELESS OBTURATOR: Brand: WECK VISTA

## (undated) DEVICE — SUTURE VICRYL + SZ 0 L27IN ABSRB VLT L26MM UR-6 5/8 CIR VCP603H

## (undated) DEVICE — GLOVE SURG SZ 65 THK91MIL LTX FREE SYN POLYISOPRENE

## (undated) DEVICE — SUTURE VICRYL COAT SZ 4-0 L18IN ABSRB UD L19MM PS-2 1/2 CIR J496G

## (undated) DEVICE — ROBOTIC GENERAL-SFMC: Brand: MEDLINE INDUSTRIES, INC.

## (undated) DEVICE — STRIP,CLOSURE,WOUND,MEDI-STRIP,1/2X4: Brand: MEDLINE